# Patient Record
Sex: FEMALE | Race: WHITE | NOT HISPANIC OR LATINO | Employment: OTHER
[De-identification: names, ages, dates, MRNs, and addresses within clinical notes are randomized per-mention and may not be internally consistent; named-entity substitution may affect disease eponyms.]

---

## 2017-07-22 ENCOUNTER — HEALTH MAINTENANCE LETTER (OUTPATIENT)
Age: 27
End: 2017-07-22

## 2018-06-22 ENCOUNTER — TRANSFERRED RECORDS (OUTPATIENT)
Dept: HEALTH INFORMATION MANAGEMENT | Facility: CLINIC | Age: 28
End: 2018-06-22

## 2018-06-22 LAB
ABO + RH BLD: NORMAL
ABO + RH BLD: NORMAL
BLD GP AB SCN SERPL QL: NORMAL
HBV SURFACE AG SERPL QL IA: NORMAL
HIV 1+2 AB+HIV1 P24 AG SERPL QL IA: NON REACTIVE
RUBELLA ANTIBODY IGG QUANTITATIVE: NORMAL IU/ML
TREPONEMA ANTIBODIES: NORMAL

## 2018-09-05 ENCOUNTER — APPOINTMENT (OUTPATIENT)
Dept: GENERAL RADIOLOGY | Facility: CLINIC | Age: 28
End: 2018-09-05
Attending: PHYSICIAN ASSISTANT
Payer: COMMERCIAL

## 2018-09-05 ENCOUNTER — HOSPITAL ENCOUNTER (EMERGENCY)
Facility: CLINIC | Age: 28
Discharge: HOME | End: 2018-09-05
Attending: EMERGENCY MEDICINE | Admitting: EMERGENCY MEDICINE
Payer: COMMERCIAL

## 2018-09-05 VITALS
WEIGHT: 140 LBS | RESPIRATION RATE: 13 BRPM | OXYGEN SATURATION: 100 % | BODY MASS INDEX: 21.76 KG/M2 | SYSTOLIC BLOOD PRESSURE: 108 MMHG | DIASTOLIC BLOOD PRESSURE: 83 MMHG | TEMPERATURE: 98.5 F

## 2018-09-05 DIAGNOSIS — R07.9 ACUTE CHEST PAIN: ICD-10-CM

## 2018-09-05 LAB
ANION GAP SERPL CALCULATED.3IONS-SCNC: 7 MMOL/L (ref 3–14)
BASOPHILS # BLD AUTO: 0 10E9/L (ref 0–0.2)
BASOPHILS NFR BLD AUTO: 0.4 %
BUN SERPL-MCNC: 9 MG/DL (ref 7–30)
CALCIUM SERPL-MCNC: 8.6 MG/DL (ref 8.5–10.1)
CHLORIDE SERPL-SCNC: 106 MMOL/L (ref 94–109)
CO2 SERPL-SCNC: 25 MMOL/L (ref 20–32)
CREAT SERPL-MCNC: 0.71 MG/DL (ref 0.52–1.04)
D DIMER PPP FEU-MCNC: 0.4 UG/ML FEU (ref 0–0.5)
DIFFERENTIAL METHOD BLD: NORMAL
EOSINOPHIL # BLD AUTO: 0.2 10E9/L (ref 0–0.7)
EOSINOPHIL NFR BLD AUTO: 1.6 %
ERYTHROCYTE [DISTWIDTH] IN BLOOD BY AUTOMATED COUNT: 13.1 % (ref 10–15)
GFR SERPL CREATININE-BSD FRML MDRD: >90 ML/MIN/1.7M2
GLUCOSE SERPL-MCNC: 82 MG/DL (ref 70–99)
HCT VFR BLD AUTO: 40.2 % (ref 35–47)
HGB BLD-MCNC: 13.5 G/DL (ref 11.7–15.7)
IMM GRANULOCYTES # BLD: 0.1 10E9/L (ref 0–0.4)
IMM GRANULOCYTES NFR BLD: 0.5 %
INTERPRETATION ECG - MUSE: NORMAL
LYMPHOCYTES # BLD AUTO: 1.8 10E9/L (ref 0.8–5.3)
LYMPHOCYTES NFR BLD AUTO: 16.8 %
MCH RBC QN AUTO: 29.2 PG (ref 26.5–33)
MCHC RBC AUTO-ENTMCNC: 33.6 G/DL (ref 31.5–36.5)
MCV RBC AUTO: 87 FL (ref 78–100)
MONOCYTES # BLD AUTO: 0.7 10E9/L (ref 0–1.3)
MONOCYTES NFR BLD AUTO: 5.9 %
NEUTROPHILS # BLD AUTO: 8.2 10E9/L (ref 1.6–8.3)
NEUTROPHILS NFR BLD AUTO: 74.8 %
NRBC # BLD AUTO: 0 10*3/UL
NRBC BLD AUTO-RTO: 0 /100
PLATELET # BLD AUTO: 203 10E9/L (ref 150–450)
POTASSIUM SERPL-SCNC: 3.4 MMOL/L (ref 3.4–5.3)
RBC # BLD AUTO: 4.63 10E12/L (ref 3.8–5.2)
SODIUM SERPL-SCNC: 138 MMOL/L (ref 133–144)
WBC # BLD AUTO: 11 10E9/L (ref 4–11)

## 2018-09-05 PROCEDURE — 80048 BASIC METABOLIC PNL TOTAL CA: CPT | Performed by: PHYSICIAN ASSISTANT

## 2018-09-05 PROCEDURE — 93005 ELECTROCARDIOGRAM TRACING: CPT

## 2018-09-05 PROCEDURE — 85025 COMPLETE CBC W/AUTO DIFF WBC: CPT | Performed by: PHYSICIAN ASSISTANT

## 2018-09-05 PROCEDURE — 85379 FIBRIN DEGRADATION QUANT: CPT | Performed by: PHYSICIAN ASSISTANT

## 2018-09-05 PROCEDURE — 71046 X-RAY EXAM CHEST 2 VIEWS: CPT

## 2018-09-05 PROCEDURE — 99285 EMERGENCY DEPT VISIT HI MDM: CPT

## 2018-09-05 RX ORDER — PRENATAL VIT/IRON FUM/FOLIC AC 27MG-0.8MG
1 TABLET ORAL DAILY
COMMUNITY

## 2018-09-05 RX ORDER — CETIRIZINE HYDROCHLORIDE 10 MG/1
10 TABLET ORAL DAILY
Status: ON HOLD | COMMUNITY
End: 2019-03-06

## 2018-09-05 ASSESSMENT — ENCOUNTER SYMPTOMS
ARTHRALGIAS: 0
SHORTNESS OF BREATH: 1

## 2018-09-05 NOTE — ED AVS SNAPSHOT
[facility] Emergency Department    [address]    [facility] [address]    Phone:  [phone]    Fax:  [phone]                                       Lisa Redmond   MRN: [id number]    Department:  [facility] Emergency Department   Date of Visit:  9/5/2018           After Visit Summary Signature Page     I have received my discharge instructions, and my questions have been answered. I have discussed any challenges I see with this plan with the nurse or doctor.    ..........................................................................................................................................  Patient/Patient Representative Signature      ..........................................................................................................................................  Patient Representative Print Name and Relationship to Patient    ..................................................               ................................................  Date                                            Time    ..........................................................................................................................................  Reviewed by Signature/Title    ...................................................              ..............................................  Date                                                            Time          22EPIC Rev 08/18

## 2018-09-05 NOTE — ED AVS SNAPSHOT
[facility] Emergency Department    [address]    [facility] [address]    Phone:  [phone]    Fax:  [phone]                                       Lisa Redmond   MRN: [id number]    Department:  [facility] Emergency Department   Date of Visit:  9/5/2018           Patient Information     Date Of Birth          1990        Your diagnoses for this visit were:     Acute chest pain        You were seen by Bradford Dumont MD.      Follow-up Information     Follow up with Raysa Roman MD In 3 days.    Specialty:  Internal Medicine    Contact information:    [address] SUN[address]  [address]  [facility] [address]  [phone]          Follow up with [facility] Emergency Department.    Specialty:  EMERGENCY MEDICINE    Why:  If symptoms worsen    Contact information:    [address]  [facility] [address]  [phone]        Discharge Instructions         *CHEST PAIN, NONCARDIAC    Based on your visit today, the exact cause of your chest pain is not certain. Your condition does not seem serious and your pain does not appear to be coming from your heart. However, sometimes the signs of a serious problem take more time to appear. Therefore, please watch for the warning signs listed below.  HOME CARE:  1. Rest today and avoid strenuous activity.  2. Take any prescribed medicine as directed.  FOLLOW UP with your doctor in 1-3 days.   GET PROMPT MEDICAL ATTENTION if any of the following occur:    A change in the type of pain: if it feels different, becomes more severe, lasts longer, or begins to spread into your shoulder, arm, neck, jaw or back    Shortness of breath or increased pain with breathing    Cough with blood or dark colored sputum (phlegm)    Weakness, dizziness, or fainting    Fever over 101  F (38.3  C)    Swelling, pain or redness in one leg    7652-4083 [employer]. [address] [place] [address]  [place] [address]. All rights reserved. This information is not intended as a substitute for professional medical care. Always follow your healthcare professional's instructions.  This information has been modified by your health care provider with permission from the publisher.      24 Hour Appointment Hotline       To make an appointment at any [facility], call [phone] ([phone]). If you don't have a family doctor or clinic, we will help you find one. [facility] [place] are conveniently located to serve the needs of you and your family.             Review of your medicines      Our records show that you are taking the medicines listed below. If these are incorrect, please call your family doctor or clinic.        Dose / Directions Last dose taken    BENADRYL PO        Refills:  0        cetirizine 10 MG tablet   Commonly known as:  zyrTEC   Dose:  10 mg        Take 10 mg by mouth daily   Refills:  0        NO ACTIVE MEDICATIONS        Refills:  0        prenatal multivitamin plus iron 27-0.8 MG Tabs per tablet   Dose:  1 tablet        Take 1 tablet by mouth daily   Refills:  0                Procedures and tests performed during your visit     Basic metabolic panel    CBC with platelets differential    D dimer quantitative    EKG 12 lead    Peripheral IV catheter    XR Chest 2 Views      Orders Needing Specimen Collection     None      Pending Results     No orders found from 9/3/2018 to 9/6/2018.            Pending Culture Results     No orders found from 9/3/2018 to 9/6/2018.            Pending Results Instructions     If you had any lab results that were not finalized at the time of your Discharge, you can call the ED Lab Result RN at [phone]. You will be contacted by this team for any positive Lab results or changes in treatment. The nurses are available 7 days a week from 10A to 6:30P.  You can leave a message 24 hours per day and they will return your call.        Test Results From Your Hospital  Stay        9/5/2018 10:44 AM      Component Results     Component Value Ref Range & Units Status    WBC 11.0 4.0 - 11.0 10e9/L Final    RBC Count 4.63 3.8 - 5.2 10e12/L Final    Hemoglobin 13.5 11.7 - 15.7 g/dL Final    Hematocrit 40.2 35.0 - 47.0 % Final    MCV 87 78 - 100 fl Final    MCH 29.2 26.5 - 33.0 pg Final    MCHC 33.6 31.5 - 36.5 g/dL Final    RDW 13.1 10.0 - 15.0 % Final    Platelet Count 203 150 - 450 10e9/L Final    Diff Method Automated Method  Final    % Neutrophils 74.8 % Final    % Lymphocytes 16.8 % Final    % Monocytes 5.9 % Final    % Eosinophils 1.6 % Final    % Basophils 0.4 % Final    % Immature Granulocytes 0.5 % Final    Nucleated RBCs 0 0 /100 Final    Absolute Neutrophil 8.2 1.6 - 8.3 10e9/L Final    Absolute Lymphocytes 1.8 0.8 - 5.3 10e9/L Final    Absolute Monocytes 0.7 0.0 - 1.3 10e9/L Final    Absolute Eosinophils 0.2 0.0 - 0.7 10e9/L Final    Absolute Basophils 0.0 0.0 - 0.2 10e9/L Final    Abs Immature Granulocytes 0.1 0 - 0.4 10e9/L Final    Absolute Nucleated RBC 0.0  Final         9/5/2018 10:53 AM      Component Results     Component Value Ref Range & Units Status    D Dimer 0.4 0.0 - 0.50 ug/ml FEU Final    This D-dimer assay is intended for use in conjunction with a clinical pretest   probability assessment model to exclude pulmonary embolism (PE) and deep   venous thrombosis (DVT) in outpatients suspected of PE or DVT. The cut-off   value is 0.5 ug/mL FEU.           9/5/2018 10:58 AM      Component Results     Component Value Ref Range & Units Status    Sodium 138 133 - 144 mmol/L Final    Potassium 3.4 3.4 - 5.3 mmol/L Final    Chloride 106 94 - 109 mmol/L Final    Carbon Dioxide 25 20 - 32 mmol/L Final    Anion Gap 7 3 - 14 mmol/L Final    Glucose 82 70 - 99 mg/dL Final    Urea Nitrogen 9 7 - 30 mg/dL Final    Creatinine 0.71 0.52 - 1.04 mg/dL Final    GFR Estimate >90 >60 mL/min/1.7m2 Final    Non  GFR Calc    GFR Estimate If Black >90 >60 mL/min/1.7m2  Final     GFR Calc    Calcium 8.6 8.5 - 10.1 mg/dL Final         9/5/2018 11:16 AM      Narrative     CHEST TWO VIEWS   9/5/2018 10:58 AM     HISTORY: Sudden onset left-sided chest pain.     COMPARISON: None.        Impression     IMPRESSION: Lungs are well-inflated and clear. No evidence of  pneumothorax or effusion. Heart size is normal.    TOM KEVIN MD                Clinical Quality Measure: Blood Pressure Screening     Your blood pressure was checked while you were in the emergency department today. The last reading we obtained was  BP: 108/83 . Please read the guidelines below about what these numbers mean and what you should do about them.  If your systolic blood pressure (the top number) is less than 120 and your diastolic blood pressure (the bottom number) is less than 80, then your blood pressure is normal. There is nothing more that you need to do about it.  If your systolic blood pressure (the top number) is 120-139 or your diastolic blood pressure (the bottom number) is 80-89, your blood pressure may be higher than it should be. You should have your blood pressure rechecked within a year by a primary care provider.  If your systolic blood pressure (the top number) is 140 or greater or your diastolic blood pressure (the bottom number) is 90 or greater, you may have high blood pressure. High blood pressure is treatable, but if left untreated over time it can put you at risk for heart attack, stroke, or kidney failure. You should have your blood pressure rechecked by a primary care provider within the next 4 weeks.  If your provider in the emergency department today gave you specific instructions to follow-up with your doctor or provider even sooner than that, you should follow that instruction and not wait for up to 4 weeks for your follow-up visit.        Thank you for choosing [facility]       Thank you for choosing [facility] for your care. Our goal is always to provide you with  "excellent care. Hearing back from our patients is one way we can continue to improve our services. Please take a few minutes to complete the written survey that you may receive in the mail after you visit with us. Thank you!        [employer] Information     [employer] lets you send messages to your doctor, view your test results, renew your prescriptions, schedule appointments and more. To sign up, go to [email or web address] . Click on \"Log in\" on the left side of the screen, which will take you to the Welcome page. Then click on \"Sign up Now\" on the right side of the page.     You will be asked to enter the access code listed below, as well as some personal information. Please follow the directions to create your username and password.     Your access code is: [email or web address]  Expires: 2018 11:25 AM     Your access code will  in 90 days. If you need help or a new code, please call your [facility] clinic or [phone].        Care EveryWhere ID     This is your Care EveryWhere ID. This could be used by other organizations to access your [facility] medical records  [id number]        Equal Access to Services     THERESA HOLT : Hadcam Cortez, erika bassett, jessica aguirre, sophia bangura . So [facility] [phone].    ATENCIÓN: Si habla español, tiene a rae disposición servicios gratuitos de asistencia lingüística. Llame al [phone].    We comply with applicable federal civil rights laws and [place] laws. We do not discriminate on the basis of race, color, national origin, age, disability, sex, sexual orientation, or gender identity.            After Visit Summary       This is your record. Keep this with you and show to your community pharmacist(s) and doctor(s) at your next visit.                  "

## 2018-09-05 NOTE — ED NOTES
Emergency Department Attending Supervision Note  9/5/2018  11:51 AM      I evaluated this patient in conjunction with Fransisco OLSON      Briefly, this 26 y/o patient presented after referral [facility]et her clinic after an episode of chest pain yesterday and is now feeling some symptoms of shortness of breath today. The patient is approzximatyely 15 weeks pregnany. She is denying any complications with the pregnancy, specifically any bleeding or other abdominal pain. She has no chest discomfort, either pleuritic or with movement. She said she had some symptomatology feeling shortness of breath this morning but does not feel any shortness of breath at rest at this time. Her lab tests, EKG and imaging are unremarkable.       On my exam,  HENT: Patient TMs are unremarkable.   Lungs are clear. There are no rales or evidence of increased work of breathing.   CV: Heart sounds regular.   Abdomen: Nontender.   MSK: No lower extremity edema or calf tenderness.     Results:    ED course:    My impression: Testing in ED is very reassuring.  There is no indication for further testing. It is very unlikely that she has a PE given her symptoms.  There are no concerning vitals signs or physical findings We reviewed  the results with patient and she is in agreement with her plan of care.         Diagnosis    ICD-10-CM   1. Acute chest pain R07.9         MD Tim Mckeon Lucas P, MD  09/05/18 0523

## 2018-09-05 NOTE — DISCHARGE INSTRUCTIONS
*CHEST PAIN, NONCARDIAC    Based on your visit today, the exact cause of your chest pain is not certain. Your condition does not seem serious and your pain does not appear to be coming from your heart. However, sometimes the signs of a serious problem take more time to appear. Therefore, please watch for the warning signs listed below.  HOME CARE:  1. Rest today and avoid strenuous activity.  2. Take any prescribed medicine as directed.  FOLLOW UP with your doctor in 1-3 days.   GET PROMPT MEDICAL ATTENTION if any of the following occur:    A change in the type of pain: if it feels different, becomes more severe, lasts longer, or begins to spread into your shoulder, arm, neck, jaw or back    Shortness of breath or increased pain with breathing    Cough with blood or dark colored sputum (phlegm)    Weakness, dizziness, or fainting    Fever over 101  F (38.3  C)    Swelling, pain or redness in one leg    8918-4194 [employer]. [address] [place] [address]. All rights reserved. This information is not intended as a substitute for professional medical care. Always follow your healthcare professional's instructions.  This information has been modified by your health care provider with permission from the publisher.

## 2018-09-05 NOTE — ED TRIAGE NOTES
Chest pain started yesterday. States she was not doing anything at that time. It was a sharp pain, lasting approx 30 mins. No longer having the pain, but is now feeling more SOB, worse as she moves around. 15 weeks pregnant. Denies having symptoms like this in the past.

## 2018-09-05 NOTE — ED PROVIDER NOTES
History     Chief Complaint:  Chest Pain      HPI   Lisa Redmond is a  27 year old  female , 15 weeks gestation,who presents with her  for evaluation of a 30 minute episode of chest pain and shortness of breath last evening. The patient was called her OB/GYN nursing line where she was informed that if she develops another episode of chest pain that she should come into the Emergency Department for emergent work up. She reports that her cough developed as she was just walking around and was worsened with coughing. The patient reports that she has not had another episode of chest pain since yesterday. This morning the patient received a call from her OB/GYN nursing line where she reported that the chest pain had resolved but she still had symptoms of shortness of breath so she was advised to come to the Emergency Department for a work up. She has no recent travel history or other prolonged immobilization. She denies any chest pain here today. She denies past history of smoking. She is currently taking prenatal vitamins. She denies swelling or pain in the legs.     Allergies:  No Known Allergies     Medications:    Zyrtec PO PRN  Benadryl PO PRN  Prenatal vitamins     Past Medical History:    Allergic rhinitis due to other allergen  Generalized hyperhidrosis  Acne  Atopic dermatitis and related conditions    Past Surgical History:    No past surgical history on file.    Family History:    Lipids  coronary artery disease  Asthma    Social History:  The patient  reports that she has never smoked. She does not have any smokeless tobacco history on file. She reports that she does not drink alcohol or use illicit drugs.   Marital Status:   [2]     Review of Systems   Respiratory: Positive for shortness of breath.    Cardiovascular: Positive for chest pain (none today). Negative for leg swelling.   Musculoskeletal: Negative for arthralgias.   All other systems reviewed and are  negative.    Physical Exam   First Vitals:  BP: 124/72  Pulse: (P) 79  Heart Rate: (P) 79  Temp: 98.5  F (36.9  C)  Resp: (P) 18  Weight: 63.5 kg (140 lb)  SpO2: (P) 100 %      Physical Exam    General: Well-nourished, appears stated age  Eyes: PERRL, conjunctivae pink no scleral icterus or conjunctival injection  ENT:  Moist mucus membranes, pharynx nonerythematous and without erythema  Respiratory:  No respiratory distress, no wheezes, crackles or rales  CV: Normal rate, no murmurs, rubs or gallops  GI: fundus nonpalpable, Nontender, nondistended, normal bowel sounds, no rebound or guarding  : No CVA tenderness  Skin: Warm, dry.  No rashes or petechiae  Musculoskeletal: No peripheral edema or calf tenderness, strength 5 out of 5 throughout  Neuro: GCS 15, alert and oriented to person/place/time, cranial nerves II through XII intact  Psychiatric: Normal affect      Emergency Department Course   ECG (10:10:45):  Rate 91 bpm. ME interval 146. QRS duration 86. QT/QTc 354/435. P-R-T axes 77 101 66. Normal sinus rhythm with sinus arrhythmia. Rightward axis. Borderline EKG.  Interpreted at 1015 by Tim Felder**.    Imaging:  XR Chest 2 Views   Final Result   IMPRESSION: Lungs are well-inflated and clear. No evidence of   pneumothorax or effusion. Heart size is normal.      TOM KEVIN MD           Laboratory:  CBC: WNL  D Dimer 0.4  BMP: WNL     Emergency Department Course:  Past medical records, nursing notes, and vitals reviewed.  1019: I performed an exam of the patient and obtained history, as documented above.       Patient was evaluated by Dr. Dumont.     Rechecked the patient, findings and plan explained to the patient. Patient discharged home, status improved, with instructions regarding supportive care, medications, and reasons to return as well as the importance of close follow-up was reviewed.    Impression & Plan    Medical Decision Making:    Lisa Redmond is a 27-year-old   female who presents with chest pain.  The work up in the Emergency Department is negative.  The differential diagnosis of chest pain is broad and includes life threatening etiologies such as Acute coronary syndrome, Myocardial infarction, Pulmonary Embolism, and Acute Aortic Dissection.  Other causes may include pneumonia, pneumothorax, pericarditis, pleurisy, and esophageal spasm.  Evaluation in the emergency department today included a EKG that was normal sinus rhythm without ST segment changes.  Unremarkable CBC, BMP and a negative d-dimer study.  Chest x-ray shows no evidence of pneumothorax or effusion.  Primary concern for this patient's chest pain was likelihood of a pulmonary embolism given her pregnancy.  Although unlikely at this stage of pregnancy and the patient had no tachycardia, tachypnea, or hypoxia or pleuritic chest pain that would support the diagnosis of a PE her negative d-dimer study is reassuring.  No serious etiology for the chest pain was detected today during this visit.  Patient has been chest pain-free since yesterday currently no indication for further evaluation.  Close follow up with primary care and OB is indicated should the pain continue, as further work up may be performed; this was made clear to Lisa, who understands.    Critical Care time:  none    Diagnosis:    ICD-10-CM    1. Acute chest pain R07.9 Basic metabolic panel       Disposition:  discharged to home    Discharge Medications:  Discharge Medication List as of 9/5/2018 11:35 AM          Fransisco Valencia PA-C  [facility] EMERGENCY DEPARTMENT       Fransisco Valencia PA-C  09/05/18 123    See Supervisory note         Bradford Dumont MD  09/05/18 5303

## 2019-02-05 LAB — GROUP B STREP PCR: NORMAL

## 2019-03-05 ENCOUNTER — HOSPITAL ENCOUNTER (INPATIENT)
Facility: CLINIC | Age: 29
LOS: 1 days | Discharge: HOME | End: 2019-03-06
Attending: OBSTETRICS & GYNECOLOGY | Admitting: OBSTETRICS & GYNECOLOGY
Payer: COMMERCIAL

## 2019-03-05 LAB
ABO + RH BLD: NORMAL
ABO + RH BLD: NORMAL
BLD GP AB SCN SERPL QL: NORMAL
BLOOD BANK CMNT PATIENT-IMP: NORMAL
SPECIMEN EXP DATE BLD: NORMAL

## 2019-03-05 PROCEDURE — 25800030 ZZH RX IP 258 OP 636: Performed by: OBSTETRICS & GYNECOLOGY

## 2019-03-05 PROCEDURE — 36415 COLL VENOUS BLD VENIPUNCTURE: CPT | Performed by: OBSTETRICS & GYNECOLOGY

## 2019-03-05 PROCEDURE — 12000035 ZZH R&B POSTPARTUM

## 2019-03-05 PROCEDURE — 72200001 ZZH LABOR CARE VAGINAL DELIVERY SINGLE

## 2019-03-05 PROCEDURE — 0064U ANTB TP TOTAL&RPR IA QUAL: CPT | Performed by: OBSTETRICS & GYNECOLOGY

## 2019-03-05 PROCEDURE — 10907ZC DRAINAGE OF AMNIOTIC FLUID, THERAPEUTIC FROM PRODUCTS OF CONCEPTION, VIA NATURAL OR ARTIFICIAL OPENING: ICD-10-PCS | Performed by: OBSTETRICS & GYNECOLOGY

## 2019-03-05 PROCEDURE — 25000125 ZZHC RX 250: Performed by: OBSTETRICS & GYNECOLOGY

## 2019-03-05 PROCEDURE — 25000132 ZZH RX MED GY IP 250 OP 250 PS 637: Performed by: OBSTETRICS & GYNECOLOGY

## 2019-03-05 PROCEDURE — 0KQM0ZZ REPAIR PERINEUM MUSCLE, OPEN APPROACH: ICD-10-PCS | Performed by: OBSTETRICS & GYNECOLOGY

## 2019-03-05 PROCEDURE — 86900 BLOOD TYPING SEROLOGIC ABO: CPT | Performed by: OBSTETRICS & GYNECOLOGY

## 2019-03-05 PROCEDURE — 86901 BLOOD TYPING SEROLOGIC RH(D): CPT | Performed by: OBSTETRICS & GYNECOLOGY

## 2019-03-05 PROCEDURE — 3E033VJ INTRODUCTION OF OTHER HORMONE INTO PERIPHERAL VEIN, PERCUTANEOUS APPROACH: ICD-10-PCS | Performed by: OBSTETRICS & GYNECOLOGY

## 2019-03-05 PROCEDURE — 86850 RBC ANTIBODY SCREEN: CPT | Performed by: OBSTETRICS & GYNECOLOGY

## 2019-03-05 RX ORDER — IBUPROFEN 400 MG/1
800 TABLET, FILM COATED ORAL EVERY 6 HOURS PRN
Status: DISCONTINUED | OUTPATIENT
Start: 2019-03-05 | End: 2019-03-06 | Stop reason: HOSPADM

## 2019-03-05 RX ORDER — HYDROCORTISONE 2.5 %
CREAM (GRAM) TOPICAL 3 TIMES DAILY PRN
Status: DISCONTINUED | OUTPATIENT
Start: 2019-03-05 | End: 2019-03-06 | Stop reason: HOSPADM

## 2019-03-05 RX ORDER — SODIUM CHLORIDE, SODIUM LACTATE, POTASSIUM CHLORIDE, CALCIUM CHLORIDE 600; 310; 30; 20 MG/100ML; MG/100ML; MG/100ML; MG/100ML
INJECTION, SOLUTION INTRAVENOUS CONTINUOUS
Status: DISCONTINUED | OUTPATIENT
Start: 2019-03-05 | End: 2019-03-05

## 2019-03-05 RX ORDER — CARBOPROST TROMETHAMINE 250 UG/ML
250 INJECTION, SOLUTION INTRAMUSCULAR
Status: DISCONTINUED | OUTPATIENT
Start: 2019-03-05 | End: 2019-03-05

## 2019-03-05 RX ORDER — LANOLIN 100 %
OINTMENT (GRAM) TOPICAL
Status: DISCONTINUED | OUTPATIENT
Start: 2019-03-05 | End: 2019-03-06 | Stop reason: HOSPADM

## 2019-03-05 RX ORDER — OXYCODONE AND ACETAMINOPHEN 5; 325 MG/1; MG/1
1 TABLET ORAL
Status: DISCONTINUED | OUTPATIENT
Start: 2019-03-05 | End: 2019-03-05

## 2019-03-05 RX ORDER — METHYLERGONOVINE MALEATE 0.2 MG/ML
200 INJECTION INTRAVENOUS
Status: DISCONTINUED | OUTPATIENT
Start: 2019-03-05 | End: 2019-03-05

## 2019-03-05 RX ORDER — NALOXONE HYDROCHLORIDE 0.4 MG/ML
.1-.4 INJECTION, SOLUTION INTRAMUSCULAR; INTRAVENOUS; SUBCUTANEOUS
Status: DISCONTINUED | OUTPATIENT
Start: 2019-03-05 | End: 2019-03-06 | Stop reason: HOSPADM

## 2019-03-05 RX ORDER — FENTANYL CITRATE 50 UG/ML
50-100 INJECTION, SOLUTION INTRAMUSCULAR; INTRAVENOUS
Status: DISCONTINUED | OUTPATIENT
Start: 2019-03-05 | End: 2019-03-05

## 2019-03-05 RX ORDER — OXYTOCIN/0.9 % SODIUM CHLORIDE 30/500 ML
340 PLASTIC BAG, INJECTION (ML) INTRAVENOUS CONTINUOUS PRN
Status: DISCONTINUED | OUTPATIENT
Start: 2019-03-05 | End: 2019-03-06 | Stop reason: HOSPADM

## 2019-03-05 RX ORDER — OXYTOCIN/0.9 % SODIUM CHLORIDE 30/500 ML
100 PLASTIC BAG, INJECTION (ML) INTRAVENOUS CONTINUOUS
Status: DISCONTINUED | OUTPATIENT
Start: 2019-03-05 | End: 2019-03-06 | Stop reason: HOSPADM

## 2019-03-05 RX ORDER — ONDANSETRON 2 MG/ML
4 INJECTION INTRAMUSCULAR; INTRAVENOUS EVERY 6 HOURS PRN
Status: DISCONTINUED | OUTPATIENT
Start: 2019-03-05 | End: 2019-03-05

## 2019-03-05 RX ORDER — AMOXICILLIN 250 MG
2 CAPSULE ORAL 2 TIMES DAILY
Status: DISCONTINUED | OUTPATIENT
Start: 2019-03-05 | End: 2019-03-06 | Stop reason: HOSPADM

## 2019-03-05 RX ORDER — METHYLERGONOVINE MALEATE 0.2 MG/ML
200 INJECTION INTRAVENOUS
Status: DISCONTINUED | OUTPATIENT
Start: 2019-03-05 | End: 2019-03-06 | Stop reason: HOSPADM

## 2019-03-05 RX ORDER — OXYTOCIN 10 [USP'U]/ML
10 INJECTION, SOLUTION INTRAMUSCULAR; INTRAVENOUS
Status: DISCONTINUED | OUTPATIENT
Start: 2019-03-05 | End: 2019-03-06 | Stop reason: HOSPADM

## 2019-03-05 RX ORDER — OXYTOCIN/0.9 % SODIUM CHLORIDE 30/500 ML
100-340 PLASTIC BAG, INJECTION (ML) INTRAVENOUS CONTINUOUS PRN
Status: COMPLETED | OUTPATIENT
Start: 2019-03-05 | End: 2019-03-05

## 2019-03-05 RX ORDER — AMOXICILLIN 250 MG
1 CAPSULE ORAL 2 TIMES DAILY
Status: DISCONTINUED | OUTPATIENT
Start: 2019-03-05 | End: 2019-03-06 | Stop reason: HOSPADM

## 2019-03-05 RX ORDER — ACETAMINOPHEN 325 MG/1
650 TABLET ORAL EVERY 4 HOURS PRN
Status: DISCONTINUED | OUTPATIENT
Start: 2019-03-05 | End: 2019-03-06 | Stop reason: HOSPADM

## 2019-03-05 RX ORDER — NALOXONE HYDROCHLORIDE 0.4 MG/ML
.1-.4 INJECTION, SOLUTION INTRAMUSCULAR; INTRAVENOUS; SUBCUTANEOUS
Status: DISCONTINUED | OUTPATIENT
Start: 2019-03-05 | End: 2019-03-05

## 2019-03-05 RX ORDER — BISACODYL 10 MG
10 SUPPOSITORY, RECTAL RECTAL DAILY PRN
Status: DISCONTINUED | OUTPATIENT
Start: 2019-03-07 | End: 2019-03-06 | Stop reason: HOSPADM

## 2019-03-05 RX ORDER — LIDOCAINE 40 MG/G
CREAM TOPICAL
Status: DISCONTINUED | OUTPATIENT
Start: 2019-03-05 | End: 2019-03-05

## 2019-03-05 RX ORDER — OXYTOCIN/0.9 % SODIUM CHLORIDE 30/500 ML
1-24 PLASTIC BAG, INJECTION (ML) INTRAVENOUS CONTINUOUS
Status: DISCONTINUED | OUTPATIENT
Start: 2019-03-05 | End: 2019-03-05

## 2019-03-05 RX ORDER — OXYTOCIN 10 [USP'U]/ML
10 INJECTION, SOLUTION INTRAMUSCULAR; INTRAVENOUS
Status: DISCONTINUED | OUTPATIENT
Start: 2019-03-05 | End: 2019-03-05

## 2019-03-05 RX ORDER — IBUPROFEN 400 MG/1
800 TABLET, FILM COATED ORAL
Status: DISCONTINUED | OUTPATIENT
Start: 2019-03-05 | End: 2019-03-05

## 2019-03-05 RX ORDER — ACETAMINOPHEN 325 MG/1
650 TABLET ORAL EVERY 4 HOURS PRN
Status: DISCONTINUED | OUTPATIENT
Start: 2019-03-05 | End: 2019-03-05

## 2019-03-05 RX ADMIN — OXYTOCIN-SODIUM CHLORIDE 0.9% IV SOLN 30 UNIT/500ML 340 ML/HR: 30-0.9/5 SOLUTION at 14:11

## 2019-03-05 RX ADMIN — ACETAMINOPHEN 650 MG: 325 TABLET, FILM COATED ORAL at 16:12

## 2019-03-05 RX ADMIN — SENNOSIDES AND DOCUSATE SODIUM 1 TABLET: 8.6; 5 TABLET ORAL at 19:29

## 2019-03-05 RX ADMIN — OXYTOCIN-SODIUM CHLORIDE 0.9% IV SOLN 30 UNIT/500ML 2 MILLI-UNITS/MIN: 30-0.9/5 SOLUTION at 10:27

## 2019-03-05 RX ADMIN — IBUPROFEN 800 MG: 400 TABLET ORAL at 22:21

## 2019-03-05 RX ADMIN — SODIUM CHLORIDE, POTASSIUM CHLORIDE, SODIUM LACTATE AND CALCIUM CHLORIDE: 600; 310; 30; 20 INJECTION, SOLUTION INTRAVENOUS at 10:06

## 2019-03-05 RX ADMIN — IBUPROFEN 800 MG: 400 TABLET ORAL at 16:12

## 2019-03-05 RX ADMIN — ACETAMINOPHEN 650 MG: 325 TABLET, FILM COATED ORAL at 22:21

## 2019-03-05 NOTE — PLAN OF CARE
Admitted today for induction, AROM. Patient ambulating in AM, pitocin started. Patient continued to ambulate in room throughout labor. Declines pain medication/intervention. Spouse at bedside, supportive.  @ 1409 of baby girl. Breastfeeding. Voided since delivery. Tylenol and Ibuprofen given after delivery.

## 2019-03-05 NOTE — PLAN OF CARE
Data: Lisa Redmond transferred to [facility] via wheelchair at 1620. Baby transferred via parent's arms.  Action: Receiving unit notified of transfer: Yes. Patient and family notified of room change. Report given to Gracia at 1620. Belongings sent to receiving unit. Accompanied by Registered Nurse. Oriented patient to surroundings. Call light within reach. ID bands double-checked with receiving RN.  Response: Patient tolerated transfer and is stable.

## 2019-03-05 NOTE — H&P
See prenatal record    29yo  at 40 6/7wks presents for IOL  Cvx 3/70/-1  GBS negative  Hx unmedicated delivery in [facility] 2[place] c/b tough delivery, pushed 1.5 hours.  Baby had some neck trauma, fine now, declines C/S for this one.  EFW 8-8.5#  FHT category I  AROM clear.  Pt wants to hold on pitocin here at first, will start at 10AM if does not have regular UC's  Pt plans on having unmedicated labor and delivery this time as well. /[place]

## 2019-03-05 NOTE — PLAN OF CARE
Data: Patient admitted to room 213 at 0715. Patient is a . Prenatal record reviewed.   Obstetric History       T1      L1     SAB0   TAB0   Ectopic0   Multiple0   Live Births1       # Outcome Date GA Lbr Sabino/2nd Weight Sex Delivery Anes PTL Lv   2 Current            1 Term 16   3.771 kg (8 lb 5 oz) F Vag-Spont None N CHULA      .  Medical History:   Past Medical History:   Diagnosis Date     Allergic rhinitis due to other allergen     has been tested, cats, dogs, seasonal     Generalized hyperhidrosis      Other acne      Other atopic dermatitis and related conditions    .  Gestational age 40w6d. Vital signs per doc flowsheet. Fetal movement present. Patient reports Induction Of Labor   as reason for admission. Support persons Humphrey present.  Action:  RN obtained at 0715. Care of patient assumed at 0715. Verbal consent for EFM, external fetal monitors applied. Admission assessment completed. Patient and support persons educated on labor process. Patient instructed to report change in fetal movement, contractions, vaginal leaking of fluid or bleeding, abdominal pain, or any concerns related to the pregnancy to her nurse/physician. Patient oriented to room, call light in reach.   Response: Dr. Downing informed of admission. Plan per provider is AROM, pit by 1000 if contractions not regular. Patient verbalized understanding of education and verbalized agreement with plan. Patient coping with labor.

## 2019-03-05 NOTE — PLAN OF CARE
Assisted Pt up to bathroom.  Pt denied feeling any dizziness when up.  Pt voided 200 ml.  Tracy cares performed.  Pt walked back to bed and tolerated well.  Families are here visiting .  Continues to monitor Pt.

## 2019-03-05 NOTE — L&D DELIVERY NOTE
27yo  at 40 6/7wks presents for IOL.  Cvx 3/70/-1  GBS neg  AROM clear  Pitocin induction  Unmedicated labor  Pushed twice in second stage  Vaginal delivery female infant 7#15oz with apgars 8 and 9 at 1407  Shoulders and body spont  Delayed cord clamping  Placenta spont and intact  QBL 150cc  Second degree midline perineal lac, repaired with local anesthetic  Mother and infant doing well/LBakerMD

## 2019-03-05 NOTE — PLAN OF CARE
Pt. admitted from L&D  via wheelchair and transferred to bed with stand by assisted. Pt. arrived with baby and was accompanied by , nurse, and arrived with personal belongings. Report was taken from Bertha Wagner RN in L&D. VSS. Fundus is firm and midline.  Vaginal bleeding is present.  SL infusing.  Pt. oriented to the room and call light system.

## 2019-03-06 ENCOUNTER — DOCUMENTATION ONLY (OUTPATIENT)
Dept: CARE COORDINATION | Facility: CLINIC | Age: 29
End: 2019-03-06

## 2019-03-06 VITALS
RESPIRATION RATE: 16 BRPM | SYSTOLIC BLOOD PRESSURE: 121 MMHG | TEMPERATURE: 97.4 F | DIASTOLIC BLOOD PRESSURE: 75 MMHG | HEART RATE: 83 BPM

## 2019-03-06 LAB — T PALLIDUM AB SER QL: NONREACTIVE

## 2019-03-06 PROCEDURE — 25000132 ZZH RX MED GY IP 250 OP 250 PS 637: Performed by: OBSTETRICS & GYNECOLOGY

## 2019-03-06 RX ADMIN — SENNOSIDES AND DOCUSATE SODIUM 1 TABLET: 8.6; 5 TABLET ORAL at 08:32

## 2019-03-06 RX ADMIN — IBUPROFEN 800 MG: 400 TABLET ORAL at 05:38

## 2019-03-06 RX ADMIN — IBUPROFEN 800 MG: 400 TABLET ORAL at 11:26

## 2019-03-06 RX ADMIN — ACETAMINOPHEN 650 MG: 325 TABLET, FILM COATED ORAL at 11:26

## 2019-03-06 RX ADMIN — ACETAMINOPHEN 650 MG: 325 TABLET, FILM COATED ORAL at 05:38

## 2019-03-06 NOTE — PLAN OF CARE
VSS.  Pain well controlled, requesting prn pain medications as needed.  Up independently in room.  Working on breastfeeding  and  cares. Pt is planning to discharge to home around 5 pm today. Continue to monitor and notify MD as needed.

## 2019-03-06 NOTE — L&D DELIVERY NOTE
Delivery Date:  2019      DELIVERY PHYSICIAN AND PRIMARY CARE PHYSICIAN:  Latasha Downing MD      Lisa is a 28-year-old  2, para 1 at 40 and 6/7 weeks gestation who presented for induction of labor.  Her cervix was favorable at 3 cm dilated, 70% effaced, and -1 station.  Group B strep was negative.  She had a history of unmedicated Labor and Delivery in [facility] complicated by a tough vaginal delivery where she pushed for 1.5 hours.  The baby did have some neck trauma, however, is fine now.  We discussed the Mohs of delivery in length and she declines a .  Estimated fetal weight is 8-8-1/2 pounds.  Fetal heart tones were category 1.  Her membranes were ruptured, and Pitocin induction was initiated.  She became complete and pushed twice in the second stage of labor.  She underwent a vaginal delivery of a female infant, 7 pounds 15 ounces, with Apgars of 8 and 9 at 1407 hours.  Shoulders and body were spontaneously delivered.  Delayed cord clamping was performed.  Placenta was spontaneously delivered and intact.  There were no cervix or sulcus lacerations noted.  The patient did have a second-degree midline perineal laceration which was repaired with 2-0 chromic suture in the standard fashion and with local anesthetic.  There were no sponges left in the vagina and the rectum was intact following the delivery and the repair.  QBL was 150 mL.  Both mother and infant were doing well at the conclusion of the delivery and the repair.      FINAL DELIVERY DIAGNOSES:   1.  Intrauterine pregnancy at 40 and 6/7 weeks gestation, admitted for induction of labor.   2.  Favorable cervix.   3.  Unmedicated labor and delivery.   4.  Pitocin induction.   5.  Short second stage of labor.   6.  Vaginal delivery of a female infant, 7 pounds 15 ounces, with Apgars of 8 and 9.   7.  Delayed cord clamping.   8.  Second-degree midline perineal laceration, repaired.         LATASHA DOWNING MD             D: 2019    T: 2019   MT: JESSICA      Name:     ENRIQUE TINEO   MRN:      [id number]        Account:        [id number]   :      1990        Delivery Date:  2019               Document: [id number]

## 2019-03-06 NOTE — PROGRESS NOTES
[facility] Obstetrics Postpartum Progress Note  3/6/2019     S: Pt doing well. Pain is well controlled. Bleeding Moderate. Infant is being . Voiding spontaneously. No BM yet    O:  /67   Pulse 83   Temp 97.8  F (36.6  C) (Oral)   Resp 18   LMP 2018   Breastfeeding? Unknown    Gen: healthy, alert and no distress    Resp: nonlabored breathing  Abd: soft, nondistended, appropriately TTP, FF at u  Ext: non-tender, no edema    Hemoglobin   Date Value Ref Range Status   2018 13.5 11.7 - 15.7 g/dL Final   2010 13.5 11.7 - 15.7 g/dL Final     Lab Results   Component Value Date    ABO O 2019    RH Pos 2019    AS Neg 2019       A: 28 year old  PPD#1 s/p    P:   Routine postpartum cares.    Ambulation encouraged  Pain control measures as needed  Reportable signs and symptoms dicussed with the patient  Anticipate discharge tomorrow      Peytno Sousa, DO  Obstetrics, Gynecology, and Infertility

## 2019-03-06 NOTE — PLAN OF CARE
Vital signs stable. Fundus firm, U/1, bleeding light, no clots. Using ice/tucks on perineum. Up in room independently, encouraged frequent voiding. Taking Tylenol/ibuprofen for pain as needed. Working on breastfeeding  with nipple shield, independent with  cares. Patient reports using a nipple shield with first baby and requested to see lactation today for tips on latching.  supportive and at the bedside. Will continue to monitor and notify MD as needed.

## 2019-03-06 NOTE — PROGRESS NOTES
[facility] will be sharing updates with you on Maternal Child Health Referral requests for home care services.  This is for care coordination purposes and alert you to referral status.  We received the referral for  Lisa Redmond; MRN [id number] and want to update you:      Home visit for postpartum/  assessment and education offered to patient.  Patient declined visit for herself and baby.  Advised to follow up with Primary Care Providers for mom and baby.   Ordering MD and Primary Care Providers for mom and baby notified.     Sincerely [facility]  Aliyah Wilde  [phone]

## 2020-08-06 LAB
HBV SURFACE AG SERPL QL IA: NORMAL
HIV 1+2 AB+HIV1 P24 AG SERPL QL IA: NORMAL
RUBELLA ANTIBODY IGG QUANTITATIVE: 41 IU/ML
TREPONEMA ANTIBODIES: NORMAL

## 2021-02-15 LAB — GROUP B STREP PCR: NORMAL

## 2021-03-11 ENCOUNTER — HOSPITAL ENCOUNTER (INPATIENT)
Facility: CLINIC | Age: 31
LOS: 1 days | Discharge: HOME | End: 2021-03-12
Attending: OBSTETRICS & GYNECOLOGY | Admitting: OBSTETRICS & GYNECOLOGY
Payer: COMMERCIAL

## 2021-03-11 LAB
ABO + RH BLD: NORMAL
ABO + RH BLD: NORMAL
ALT SERPL W P-5'-P-CCNC: 20 U/L (ref 0–50)
AST SERPL W P-5'-P-CCNC: 20 U/L (ref 0–45)
BASOPHILS # BLD AUTO: 0 10E9/L (ref 0–0.2)
BASOPHILS NFR BLD AUTO: 0.3 %
BLD GP AB SCN SERPL QL: NORMAL
BLOOD BANK CMNT PATIENT-IMP: NORMAL
CREAT SERPL-MCNC: 0.73 MG/DL (ref 0.52–1.04)
DIFFERENTIAL METHOD BLD: ABNORMAL
EOSINOPHIL # BLD AUTO: 0.1 10E9/L (ref 0–0.7)
EOSINOPHIL NFR BLD AUTO: 0.5 %
ERYTHROCYTE [DISTWIDTH] IN BLOOD BY AUTOMATED COUNT: 13 % (ref 10–15)
GFR SERPL CREATININE-BSD FRML MDRD: >90 ML/MIN/{1.73_M2}
HCT VFR BLD AUTO: 40.7 % (ref 35–47)
HGB BLD-MCNC: 13 G/DL (ref 11.7–15.7)
IMM GRANULOCYTES # BLD: 0.1 10E9/L (ref 0–0.4)
IMM GRANULOCYTES NFR BLD: 0.5 %
LABORATORY COMMENT REPORT: NORMAL
LYMPHOCYTES # BLD AUTO: 1.3 10E9/L (ref 0.8–5.3)
LYMPHOCYTES NFR BLD AUTO: 12.2 %
MCH RBC QN AUTO: 27.8 PG (ref 26.5–33)
MCHC RBC AUTO-ENTMCNC: 31.9 G/DL (ref 31.5–36.5)
MCV RBC AUTO: 87 FL (ref 78–100)
MONOCYTES # BLD AUTO: 0.5 10E9/L (ref 0–1.3)
MONOCYTES NFR BLD AUTO: 4.6 %
NEUTROPHILS # BLD AUTO: 8.9 10E9/L (ref 1.6–8.3)
NEUTROPHILS NFR BLD AUTO: 81.9 %
NRBC # BLD AUTO: 0 10*3/UL
NRBC BLD AUTO-RTO: 0 /100
PLATELET # BLD AUTO: 171 10E9/L (ref 150–450)
RBC # BLD AUTO: 4.68 10E12/L (ref 3.8–5.2)
RUPTURE OF FETAL MEMBRANES BY ROM PLUS: POSITIVE
SARS-COV-2 RNA RESP QL NAA+PROBE: NEGATIVE
SPECIMEN EXP DATE BLD: NORMAL
SPECIMEN SOURCE: NORMAL
T PALLIDUM AB SER QL: NONREACTIVE
WBC # BLD AUTO: 10.9 10E9/L (ref 4–11)

## 2021-03-11 PROCEDURE — 86850 RBC ANTIBODY SCREEN: CPT | Performed by: OBSTETRICS & GYNECOLOGY

## 2021-03-11 PROCEDURE — 258N000003 HC RX IP 258 OP 636: Performed by: OBSTETRICS & GYNECOLOGY

## 2021-03-11 PROCEDURE — 250N000009 HC RX 250: Performed by: OBSTETRICS & GYNECOLOGY

## 2021-03-11 PROCEDURE — 86901 BLOOD TYPING SEROLOGIC RH(D): CPT | Performed by: OBSTETRICS & GYNECOLOGY

## 2021-03-11 PROCEDURE — 250N000013 HC RX MED GY IP 250 OP 250 PS 637: Performed by: OBSTETRICS & GYNECOLOGY

## 2021-03-11 PROCEDURE — 82565 ASSAY OF CREATININE: CPT | Performed by: OBSTETRICS & GYNECOLOGY

## 2021-03-11 PROCEDURE — 0HQ9XZZ REPAIR PERINEUM SKIN, EXTERNAL APPROACH: ICD-10-PCS | Performed by: OBSTETRICS & GYNECOLOGY

## 2021-03-11 PROCEDURE — 36415 COLL VENOUS BLD VENIPUNCTURE: CPT | Performed by: OBSTETRICS & GYNECOLOGY

## 2021-03-11 PROCEDURE — 86900 BLOOD TYPING SEROLOGIC ABO: CPT | Performed by: OBSTETRICS & GYNECOLOGY

## 2021-03-11 PROCEDURE — 85025 COMPLETE CBC W/AUTO DIFF WBC: CPT | Performed by: OBSTETRICS & GYNECOLOGY

## 2021-03-11 PROCEDURE — 86780 TREPONEMA PALLIDUM: CPT | Performed by: OBSTETRICS & GYNECOLOGY

## 2021-03-11 PROCEDURE — 84450 TRANSFERASE (AST) (SGOT): CPT | Performed by: OBSTETRICS & GYNECOLOGY

## 2021-03-11 PROCEDURE — 84112 EVAL AMNIOTIC FLUID PROTEIN: CPT | Performed by: OBSTETRICS & GYNECOLOGY

## 2021-03-11 PROCEDURE — 59025 FETAL NON-STRESS TEST: CPT

## 2021-03-11 PROCEDURE — C9803 HOPD COVID-19 SPEC COLLECT: HCPCS

## 2021-03-11 PROCEDURE — 84460 ALANINE AMINO (ALT) (SGPT): CPT | Performed by: OBSTETRICS & GYNECOLOGY

## 2021-03-11 PROCEDURE — G0463 HOSPITAL OUTPT CLINIC VISIT: HCPCS | Mod: 25

## 2021-03-11 PROCEDURE — 120N000012 HC R&B POSTPARTUM

## 2021-03-11 PROCEDURE — 722N000001 HC LABOR CARE VAGINAL DELIVERY SINGLE

## 2021-03-11 PROCEDURE — 87635 SARS-COV-2 COVID-19 AMP PRB: CPT | Performed by: OBSTETRICS & GYNECOLOGY

## 2021-03-11 RX ORDER — NALOXONE HYDROCHLORIDE 0.4 MG/ML
0.2 INJECTION, SOLUTION INTRAMUSCULAR; INTRAVENOUS; SUBCUTANEOUS
Status: DISCONTINUED | OUTPATIENT
Start: 2021-03-11 | End: 2021-03-11

## 2021-03-11 RX ORDER — DIPHENHYDRAMINE HCL 25 MG
25 TABLET ORAL EVERY 6 HOURS PRN
Status: ON HOLD | COMMUNITY
End: 2021-03-12

## 2021-03-11 RX ORDER — ONDANSETRON 2 MG/ML
4 INJECTION INTRAMUSCULAR; INTRAVENOUS EVERY 6 HOURS PRN
Status: DISCONTINUED | OUTPATIENT
Start: 2021-03-11 | End: 2021-03-11

## 2021-03-11 RX ORDER — METHYLERGONOVINE MALEATE 0.2 MG/ML
200 INJECTION INTRAVENOUS
Status: DISCONTINUED | OUTPATIENT
Start: 2021-03-11 | End: 2021-03-11

## 2021-03-11 RX ORDER — OXYTOCIN/0.9 % SODIUM CHLORIDE 30/500 ML
340 PLASTIC BAG, INJECTION (ML) INTRAVENOUS CONTINUOUS PRN
Status: DISCONTINUED | OUTPATIENT
Start: 2021-03-11 | End: 2021-03-13 | Stop reason: HOSPADM

## 2021-03-11 RX ORDER — NALOXONE HYDROCHLORIDE 0.4 MG/ML
0.4 INJECTION, SOLUTION INTRAMUSCULAR; INTRAVENOUS; SUBCUTANEOUS
Status: DISCONTINUED | OUTPATIENT
Start: 2021-03-11 | End: 2021-03-11

## 2021-03-11 RX ORDER — AMOXICILLIN 250 MG
2 CAPSULE ORAL 2 TIMES DAILY
Status: DISCONTINUED | OUTPATIENT
Start: 2021-03-11 | End: 2021-03-13 | Stop reason: HOSPADM

## 2021-03-11 RX ORDER — CETIRIZINE HYDROCHLORIDE 10 MG/1
10 TABLET ORAL DAILY
COMMUNITY

## 2021-03-11 RX ORDER — OXYTOCIN 10 [USP'U]/ML
10 INJECTION, SOLUTION INTRAMUSCULAR; INTRAVENOUS
Status: DISCONTINUED | OUTPATIENT
Start: 2021-03-11 | End: 2021-03-13 | Stop reason: HOSPADM

## 2021-03-11 RX ORDER — OXYTOCIN/0.9 % SODIUM CHLORIDE 30/500 ML
100-340 PLASTIC BAG, INJECTION (ML) INTRAVENOUS CONTINUOUS PRN
Status: COMPLETED | OUTPATIENT
Start: 2021-03-11 | End: 2021-03-11

## 2021-03-11 RX ORDER — ACETAMINOPHEN 325 MG/1
650 TABLET ORAL EVERY 4 HOURS PRN
Status: DISCONTINUED | OUTPATIENT
Start: 2021-03-11 | End: 2021-03-11

## 2021-03-11 RX ORDER — AMOXICILLIN 250 MG
1 CAPSULE ORAL 2 TIMES DAILY
Status: DISCONTINUED | OUTPATIENT
Start: 2021-03-11 | End: 2021-03-13 | Stop reason: HOSPADM

## 2021-03-11 RX ORDER — OXYTOCIN/0.9 % SODIUM CHLORIDE 30/500 ML
1-24 PLASTIC BAG, INJECTION (ML) INTRAVENOUS CONTINUOUS
Status: DISCONTINUED | OUTPATIENT
Start: 2021-03-11 | End: 2021-03-11

## 2021-03-11 RX ORDER — IBUPROFEN 400 MG/1
800 TABLET, FILM COATED ORAL
Status: COMPLETED | OUTPATIENT
Start: 2021-03-11 | End: 2021-03-11

## 2021-03-11 RX ORDER — TRANEXAMIC ACID 10 MG/ML
1 INJECTION, SOLUTION INTRAVENOUS EVERY 30 MIN PRN
Status: DISCONTINUED | OUTPATIENT
Start: 2021-03-11 | End: 2021-03-11

## 2021-03-11 RX ORDER — SODIUM CHLORIDE, SODIUM LACTATE, POTASSIUM CHLORIDE, CALCIUM CHLORIDE 600; 310; 30; 20 MG/100ML; MG/100ML; MG/100ML; MG/100ML
INJECTION, SOLUTION INTRAVENOUS CONTINUOUS
Status: DISCONTINUED | OUTPATIENT
Start: 2021-03-11 | End: 2021-03-11

## 2021-03-11 RX ORDER — BISACODYL 10 MG
10 SUPPOSITORY, RECTAL RECTAL DAILY PRN
Status: DISCONTINUED | OUTPATIENT
Start: 2021-03-13 | End: 2021-03-13 | Stop reason: HOSPADM

## 2021-03-11 RX ORDER — MODIFIED LANOLIN
OINTMENT (GRAM) TOPICAL
Status: DISCONTINUED | OUTPATIENT
Start: 2021-03-11 | End: 2021-03-13 | Stop reason: HOSPADM

## 2021-03-11 RX ORDER — OXYTOCIN 10 [USP'U]/ML
10 INJECTION, SOLUTION INTRAMUSCULAR; INTRAVENOUS
Status: DISCONTINUED | OUTPATIENT
Start: 2021-03-11 | End: 2021-03-11

## 2021-03-11 RX ORDER — OXYTOCIN/0.9 % SODIUM CHLORIDE 30/500 ML
100 PLASTIC BAG, INJECTION (ML) INTRAVENOUS CONTINUOUS
Status: DISCONTINUED | OUTPATIENT
Start: 2021-03-11 | End: 2021-03-13 | Stop reason: HOSPADM

## 2021-03-11 RX ORDER — ACETAMINOPHEN 325 MG/1
650 TABLET ORAL EVERY 4 HOURS PRN
Status: DISCONTINUED | OUTPATIENT
Start: 2021-03-11 | End: 2021-03-13 | Stop reason: HOSPADM

## 2021-03-11 RX ORDER — OXYCODONE AND ACETAMINOPHEN 5; 325 MG/1; MG/1
1 TABLET ORAL
Status: DISCONTINUED | OUTPATIENT
Start: 2021-03-11 | End: 2021-03-11

## 2021-03-11 RX ORDER — LIDOCAINE 40 MG/G
CREAM TOPICAL
Status: DISCONTINUED | OUTPATIENT
Start: 2021-03-11 | End: 2021-03-11

## 2021-03-11 RX ORDER — TRANEXAMIC ACID 10 MG/ML
1 INJECTION, SOLUTION INTRAVENOUS EVERY 30 MIN PRN
Status: DISCONTINUED | OUTPATIENT
Start: 2021-03-11 | End: 2021-03-13 | Stop reason: HOSPADM

## 2021-03-11 RX ORDER — IBUPROFEN 400 MG/1
800 TABLET, FILM COATED ORAL EVERY 6 HOURS PRN
Status: DISCONTINUED | OUTPATIENT
Start: 2021-03-11 | End: 2021-03-13 | Stop reason: HOSPADM

## 2021-03-11 RX ORDER — CARBOPROST TROMETHAMINE 250 UG/ML
250 INJECTION, SOLUTION INTRAMUSCULAR
Status: DISCONTINUED | OUTPATIENT
Start: 2021-03-11 | End: 2021-03-11

## 2021-03-11 RX ORDER — LACTOBACILLUS RHAMNOSUS GG 10B CELL
1 CAPSULE ORAL 2 TIMES DAILY
COMMUNITY

## 2021-03-11 RX ORDER — HYDROCORTISONE 2.5 %
CREAM (GRAM) TOPICAL 3 TIMES DAILY PRN
Status: DISCONTINUED | OUTPATIENT
Start: 2021-03-11 | End: 2021-03-13 | Stop reason: HOSPADM

## 2021-03-11 RX ADMIN — ACETAMINOPHEN 650 MG: 325 TABLET, FILM COATED ORAL at 21:01

## 2021-03-11 RX ADMIN — LIDOCAINE HYDROCHLORIDE 20 ML: 10 INJECTION, SOLUTION INFILTRATION; PERINEURAL at 20:36

## 2021-03-11 RX ADMIN — Medication 340 ML/HR: at 20:28

## 2021-03-11 RX ADMIN — IBUPROFEN 800 MG: 400 TABLET ORAL at 21:01

## 2021-03-11 RX ADMIN — Medication 2 MILLI-UNITS/MIN: at 15:07

## 2021-03-11 RX ADMIN — Medication 100 ML/HR: at 21:02

## 2021-03-11 RX ADMIN — SODIUM CHLORIDE, POTASSIUM CHLORIDE, SODIUM LACTATE AND CALCIUM CHLORIDE: 600; 310; 30; 20 INJECTION, SOLUTION INTRAVENOUS at 15:04

## 2021-03-11 NOTE — PLAN OF CARE
1340 - Report received from ELZBIETA Erazo RN.  All cares assumed.  Patient is planning an unmedicated delivery and would like to hold on pitocin as long as possible.  Patient willing to start pitocin at 1500 is cervix is unchanged and contractions are not increasing in intensity or frequency.  POC discussed including pitocin, as described above, lab draw, COVID swab, IV if pitocin needed.  Patient prefers to not have an IV unless necessary.  Intermittent montoring is currently ordered by MD.  Plan to reapply monitors at 1440.  Patient verbally consents to POC.    1355 - Covid swab discussed with patient.  Patient verbally consents.  Covid swab collected and sent to lab.      1400 - Patient up on birthing ball per her request.  Patient reports frequent contractions that are tolerable at this time.

## 2021-03-11 NOTE — PLAN OF CARE
Lisa is a 29yo  40w0d presents with SROM clear fluid at 0800. Denies problems with this pregnancy, bleeding HA, vision changes or epigastric pain. Denies exposure or symptoms of COVID. Has had irregular cramping in her lower back.     ROM plus collected and sent. SVE 3-4cm/70%/-2    ROM plus positive.     1321-Dr Dial pagelow.

## 2021-03-11 NOTE — PLAN OF CARE
1430 - FHT's by doppler at 125 with no accels or decels noted.  Patient request pitocin to be started.  Della monitor discussed with patient and patient request to try the della.    1445 - Abdomen prepped for della monitor.  Della monitor applied.     1507 - Pitocin started with patient request per MD order.    1515 - Report given to Nafisa Neri RN who will assume all cares.

## 2021-03-11 NOTE — H&P
OB Admission History and Physical    HPI:  Patient is a 30 year old female who presents to Labor and Delivery at 40w0d for SROM at about 8 am, and ctx. Denies VB> +FM. Her pregnancy has been largely uncomplicated - manageable HA, and anxiety without medications. She declined genetic screening. Her pregnancy has been complicated by:    Patient Active Problem List   Diagnosis     Allergic rhinitis due to other allergen     Generalized hyperhidrosis      (normal spontaneous vaginal delivery)     Labor and delivery, indication for care         Prenatal Lab Results:     Results for ENRIQUE DAY (MRN [id number]) as of 3/11/2021 17:27   Ref. Range 3/5/2019 10:17 2020 00:00   ABO Unknown O    RH(D) Unknown Pos    Antibody Screen Unknown Neg    Test Valid Only At Latest Units:     [facility]    Specimen Expires Unknown 2019    Treponema Antibodies Unknown Nonreactive NR   Rubella Antibody IgG Quantitative Latest Units: IU/mL  41   Hep B Surface Agn Unknown  NR   HIV Antigen Antibody Combo Unknown  NR         GBS: negative  1 hour GTT: 83  3 hour GTT: n/a      OB History    Para Term  AB Living   3 2 2 0 0 2   SAB TAB Ectopic Multiple Live Births   0 0 0 0 2      # Outcome Date GA Lbr Sabino/2nd Weight Sex Delivery Anes PTL Lv   3 Current            2 Term 19 40w6d 03:15 / 00:12 3.6 kg (7 lb 15 oz) F Vag-Spont Local N CHULA      Name: ALBAN TINEO      Apgar1: 8  Apgar5: 9   1 Term 16   3.771 kg (8 lb 5 oz) F Vag-Spont None N CHULA       Past Surgical History:   Procedure Laterality Date     HC TOOTH EXTRACTION W/FORCEP         Past Medical History:   Diagnosis Date     Allergic rhinitis due to other allergen     has been tested, cats, dogs, seasonal     Generalized hyperhidrosis      Other acne      Other atopic dermatitis and related conditions        Social History     Socioeconomic History     Marital status:      Spouse name: None      Number of children: None     Years of education: None     Highest education level: None   Occupational History     None   Social Needs     Financial resource strain: None     Food insecurity     Worry: None     Inability: None     Transportation needs     Medical: None     Non-medical: None   Tobacco Use     Smoking status: Never Smoker     Smokeless tobacco: Never Used   Substance and Sexual Activity     Alcohol use: No     Drug use: No     Sexual activity: Yes     Partners: Male   Lifestyle     Physical activity     Days per week: None     Minutes per session: None     Stress: None   Relationships     Social connections     Talks on phone: None     Gets together: None     Attends Muslim service: None     Active member of club or organization: None     Attends meetings of clubs or organizations: None     Relationship status: None     Intimate partner violence     Fear of current or ex partner: None     Emotionally abused: None     Physically abused: None     Forced sexual activity: None   Other Topics Concern     None   Social History Narrative     None       Medications:    Medications Prior to Admission   Medication Sig Dispense Refill Last Dose     doxylamine (UNISOM) 25 MG TABS tablet Take 25 mg by mouth At Bedtime   3/10/2021 at Unknown time     lactobacillus rhamnosus, GG, (CULTURELL) capsule Take 1 capsule by mouth 2 times daily   Past Week at Unknown time     omeprazole (PRILOSEC) 20 MG DR capsule Take 20 mg by mouth daily   Past Week at Unknown time     Prenatal Vit-Fe Fumarate-FA (PRENATAL MULTIVITAMIN PLUS IRON) 27-0.8 MG TABS per tablet Take 1 tablet by mouth daily   3/10/2021 at Unknown time     cetirizine (ZYRTEC) 10 MG tablet Take 10 mg by mouth daily   More than a month at Unknown time     diphenhydrAMINE (BENADRYL) 25 MG tablet Take 25 mg by mouth every 6 hours as needed for itching or allergies   More than a month at Unknown time       Allergies:    Patient has no known allergies.    Review  of Systems:  A comprehensive review of systems was negative except for those noted in HPI    Physical Examination:  Current Inpatient Vital Signs: Blood pressure 129/75, temperature 99.9  F (37.7  C), temperature source Temporal, resp. rate 16, unknown if currently breastfeeding.    General: A/Ox3 NAD, breathing through contractions  Lungs:  Normal effort  Heart:  reglar rate  Abdomen:  Gravid, NTTP.   Estimated Fetal Weight: 3600g  Extremities:  Normal, NTTP  Skin:  normal    Cervical Exam:  4/80/-1    BPM: 125  Variability:  moderate.  Accelerations:  present.  Decelerations:  absent.  Contractions:  present, frequency q3-4.  Overall assessment:  Category 1      Results for ENRIQUE DAY (MRN [id number]) as of 3/11/2021 17:27   Ref. Range 3/11/2021 12:50 3/11/2021 13:54 3/11/2021 13:55   Creatinine Latest Ref Range: 0.52 - 1.04 mg/dL  0.73    GFR Estimate Latest Ref Range: >60 mL/min/1.73_m2  >90    GFR Estimate If Black Latest Ref Range: >60 mL/min/1.73_m2  >90    ALT Latest Ref Range: 0 - 50 U/L  20    AST Latest Ref Range: 0 - 45 U/L  20    Rupture of Fetal Membranes by ROM Plus Latest Ref Range: NEG^Negative  Positive (A)     WBC Latest Ref Range: 4.0 - 11.0 10e9/L  10.9    Hemoglobin Latest Ref Range: 11.7 - 15.7 g/dL  13.0    Hematocrit Latest Ref Range: 35.0 - 47.0 %  40.7    Platelet Count Latest Ref Range: 150 - 450 10e9/L  171    RBC Count Latest Ref Range: 3.8 - 5.2 10e12/L  4.68    MCV Latest Ref Range: 78 - 100 fl  87    MCH Latest Ref Range: 26.5 - 33.0 pg  27.8    MCHC Latest Ref Range: 31.5 - 36.5 g/dL  31.9    RDW Latest Ref Range: 10.0 - 15.0 %  13.0    Diff Method Unknown  Automated Method    % Neutrophils Latest Units: %  81.9    % Lymphocytes Latest Units: %  12.2    % Monocytes Latest Units: %  4.6    % Eosinophils Latest Units: %  0.5    % Basophils Latest Units: %  0.3    % Immature Granulocytes Latest Units: %  0.5    Nucleated RBCs Latest Ref Range: 0 /100  0    Absolute  Neutrophil Latest Ref Range: 1.6 - 8.3 10e9/L  8.9 (H)    Absolute Lymphocytes Latest Ref Range: 0.8 - 5.3 10e9/L  1.3    Absolute Monocytes Latest Ref Range: 0.0 - 1.3 10e9/L  0.5    Absolute Eosinophils Latest Ref Range: 0.0 - 0.7 10e9/L  0.1    Absolute Basophils Latest Ref Range: 0.0 - 0.2 10e9/L  0.0    Abs Immature Granulocytes Latest Ref Range: 0 - 0.4 10e9/L  0.1    Absolute Nucleated RBC Unknown  0.0    ABO Unknown  O    RH(D) Unknown  Pos    Antibody Screen Unknown  Neg    Test Valid Only At Latest Units:      [facility]    Specimen Expires Unknown  2021    SARS-CoV-2 Virus Specimen Source Unknown   Nasopharyngeal   SARS-CoV-2 PCR Result Unknown   NEGATIVE       Assessment/Plan:  , intrauterine pregnancy at 40w0d with KIMBERLY of 3/11/2021    1. Admit to Labor and Delivery for SROM and labor management   - Augment with pitocin if no cervical change and increase per protocol.   - anticipate    2. GBS negative.  Prophylaxis: not  3. Elevated BP on admission: CBC and PIH labs normal. Urine P:C not completed as she is ruptured and would not  at this time. Continue to monitor. If develops severe range BP then start magnesium for seizure ppx.   4. Rubella immune  5. Blood type O pos; Rhogam not indicated  6. COVID screen negative.      Alley Dial MD  [phone]  21 5:24 PM

## 2021-03-12 VITALS
SYSTOLIC BLOOD PRESSURE: 113 MMHG | OXYGEN SATURATION: 100 % | RESPIRATION RATE: 16 BRPM | HEART RATE: 74 BPM | DIASTOLIC BLOOD PRESSURE: 69 MMHG | TEMPERATURE: 97.9 F

## 2021-03-12 PROCEDURE — 250N000013 HC RX MED GY IP 250 OP 250 PS 637: Performed by: OBSTETRICS & GYNECOLOGY

## 2021-03-12 RX ORDER — NALOXONE HYDROCHLORIDE 0.4 MG/ML
0.2 INJECTION, SOLUTION INTRAMUSCULAR; INTRAVENOUS; SUBCUTANEOUS
Status: DISCONTINUED | OUTPATIENT
Start: 2021-03-12 | End: 2021-03-13 | Stop reason: HOSPADM

## 2021-03-12 RX ORDER — CALCIUM CARBONATE 500 MG/1
1000 TABLET, CHEWABLE ORAL EVERY 4 HOURS PRN
Status: DISCONTINUED | OUTPATIENT
Start: 2021-03-12 | End: 2021-03-13 | Stop reason: HOSPADM

## 2021-03-12 RX ORDER — NALOXONE HYDROCHLORIDE 0.4 MG/ML
0.4 INJECTION, SOLUTION INTRAMUSCULAR; INTRAVENOUS; SUBCUTANEOUS
Status: DISCONTINUED | OUTPATIENT
Start: 2021-03-12 | End: 2021-03-13 | Stop reason: HOSPADM

## 2021-03-12 RX ORDER — OXYCODONE HYDROCHLORIDE 5 MG/1
5 TABLET ORAL EVERY 4 HOURS PRN
Status: DISCONTINUED | OUTPATIENT
Start: 2021-03-12 | End: 2021-03-13 | Stop reason: HOSPADM

## 2021-03-12 RX ADMIN — IBUPROFEN 800 MG: 400 TABLET ORAL at 22:17

## 2021-03-12 RX ADMIN — CALCIUM CARBONATE (ANTACID) CHEW TAB 500 MG 1000 MG: 500 CHEW TAB at 17:56

## 2021-03-12 RX ADMIN — SENNOSIDES AND DOCUSATE SODIUM 1 TABLET: 8.6; 5 TABLET ORAL at 20:07

## 2021-03-12 RX ADMIN — ACETAMINOPHEN 650 MG: 325 TABLET, FILM COATED ORAL at 03:28

## 2021-03-12 RX ADMIN — ACETAMINOPHEN 650 MG: 325 TABLET, FILM COATED ORAL at 16:29

## 2021-03-12 RX ADMIN — SENNOSIDES AND DOCUSATE SODIUM 1 TABLET: 8.6; 5 TABLET ORAL at 08:00

## 2021-03-12 RX ADMIN — IBUPROFEN 800 MG: 400 TABLET ORAL at 09:35

## 2021-03-12 RX ADMIN — OXYCODONE HYDROCHLORIDE 5 MG: 5 TABLET ORAL at 11:00

## 2021-03-12 RX ADMIN — ACETAMINOPHEN 650 MG: 325 TABLET, FILM COATED ORAL at 22:17

## 2021-03-12 RX ADMIN — ACETAMINOPHEN 650 MG: 325 TABLET, FILM COATED ORAL at 09:34

## 2021-03-12 RX ADMIN — IBUPROFEN 800 MG: 400 TABLET ORAL at 16:29

## 2021-03-12 RX ADMIN — IBUPROFEN 800 MG: 400 TABLET ORAL at 03:28

## 2021-03-12 NOTE — L&D DELIVERY NOTE
OB Vaginal Delivery Note    Lisa Escalante MRN# [id number]   Age: 30 year old YOB: 1990       GA: 40w0d  GP:   Labor Complications: None   EBL: 75  mL  Delivery QBL:    Delivery Type: Vaginal, Spontaneous   ROM to Delivery Time: (Delivered) Hours: 12 Minutes: 25  [place] Weight:     1 Minute 5 Minute 10 Minute   Apgar Totals:   8   9      MADAI QIU;MARTÍN ZHANG;PHILOMENA MARIE     Delivery Details:  Lisa Escalante, a 30 year old  female delivered a viable infant with apgars of  8  And 9  . Patient was admitted with PROM and augmented with pitocin.  Delivery was via vaginal, spontaneous  to a sterile field under no anesthesia. Infant delivered in vertex  left  occiput  anterior  position with compound left hand and a double nuchal cord which was reduced at delivery. Anterior and posterior shoulders delivered without difficulty. The cord was clamped, cut twice and   3 vessels were noted. Cord blood was obtained in routine fashion with the following disposition:  .      Cord complications:  Double nuchal cord, loose, reduced at delivery of fetal head.   Placenta delivered at 3/11/2021  8:30 PM . Placental disposition was Pathology . Fundal massage performed and fundus found to be firm.     Episiotomy: none    Perineum, vagina, cervix were inspected, and the following lacerations were noted:   Perineal lacerations: 1st                Any lacerations were repaired in the usual fashion using 3-0 vicryl sutures.    Excellent hemostasis was noted. Needle count correct. Infant and patient in delivery room in good and stable condition.        Miko Escalante-Lisa [[id number]]    Labor Event Times    Labor onset date: 3/11/21 Onset time:  5:10 PM   Dilation complete date: 3/11/21 Complete time:  8:20 PM   Start pushing date/time: 3/11/2021 2023      Labor Events     labor?: No   steroids: None  Labor Type: Spontaneous,  Augmentation  Predominate monitoring during 1st stage: continuous electronic fetal monitoring     Rupture date/time: 3/11/21 0800   Rupture type: Spontaneous rupture of membranes occuring during spontaneous labor or augmentation  Fluid color: Clear  Fluid odor: Normal     Augmentation: Oxytocin     Delivery/Placenta Date and Time    Delivery Date: 3/11/21 Delivery Time:  8:25 PM   Placenta Date/Time: 3/11/2021  8:30 PM  Oxytocin given at the time of delivery: after delivery of baby     Vaginal Counts     Initial count performed by 2 team members:  Two Team Members   MANASA Castro       Ewen Suture Ewen Sponges Instruments   Initial counts 2 0 5    Added to count 0 1 0    Final counts 2 1 5    Placed during labor Accounted for at the end of labor   NA NA   NA NA   NA NA    Final count performed by 2 team members:  Two Team Members   MANASA Castro         Labor Events and Shoulder Dystocia    Fetal Tracing Prior to Delivery: Category 1  Shoulder dystocia present?: Neg     Delivery (Maternal) (Provider to Complete) ([id number])    Episiotomy: None  Perineal lacerations: 1st Repaired?: Yes   Est. blood loss (mL): 75  Number of repair packets: 1     Blood Loss  Mother: Lisa Escalante #[id number]   Start of Mother's Information    IO Blood Loss  03/11/21 1710 - 03/11/21 2045    EBL (mL) Hospital Encounter 75 mL    Total  75 mL         End of Mother's Information  Mother: Lisa Escalante #[id number]          Delivery - Provider to Complete (330251)    Delivering clinician: Juana Castro MD  Attempted Delivery Types (Choose all that apply): Spontaneous Vaginal Delivery  Delivery Type (Choose the 1 that will go to the Birth History): Vaginal, Spontaneous                   Other personnel:  Provider Role   Nadia Jonas RN Delivery Nurse   Juana Castro MD Obstetrician   Juanita Cooper RN Registered Nurse                Placenta    Delayed Cord  Clamping: Done  Date/Time: 3/11/2021  8:30 PM  Removal: Spontaneous  Disposition: Pathology           Anesthesia    Method: Local                Presentation and Position    Presentation: Vertex    Position: Left Occiput Anterior                 Juana Raysa Castro MD

## 2021-03-12 NOTE — PLAN OF CARE
Data: Lisa Escalante transferred to [facility] via wheelchair at 2225. Baby transferred via parent's arms.  Action: Receiving unit notified of transfer: Yes. Patient and family notified of room change. Report given to Ghada Harden at 2225. Belongings sent to receiving unit. Accompanied by Registered Nurse. Oriented patient to surroundings. Call light within reach. ID bands double-checked with receiving RN.  Response: Patient tolerated transfer and is stable.

## 2021-03-12 NOTE — PLAN OF CARE
Vital signs stable. Patient voiding without difficulty. Able to ambulate in room free of dizziness. Taking tylenol/ibuprofen/oxycodone for pain management. Working on breastfeeding infant every 2-3 hours. Encouraged to call with questions/concerns. Will continue to monitor.

## 2021-03-12 NOTE — LACTATION NOTE
Routine Lactation visit with Lisa, significant other Humphrey & baby girl Stormy. Getting ready for discharge later tonight if baby's 24 hour screens are good. Lisa reports feeding is going well so far, but does share her right nipple has a blister that's formed. She's pretty sure this is from a shallow latch last night. She feels in general baby latches well and has been doing better with latching than her other two children did in the beginning. Hydrogels given. Reviewed use, including but not limited to: using hydrogels or nipple ointment and avoiding concurrent use; use for 24 hours and then discarding set and replacing as needed; discarding hydrogels prior to 24 hours if they become cloudy or discolored; storing opened hydrogels in clean dry location. Encouraged good hand hygeine. Encouraged wiping nipples prior to feeding with clean cloth. Reviewed signs of further nipple damage or infection and encouraged lactation follow up as needed.    Discussed cluster feeding, what it is and when to expect it, The Second Night, satiety cues, feeding cues, and reviewed Feeding Log for home use.     Reviewed milk supply and engorgement. Reviewed typical timeline of milk supply initiation and progression over first 3-5 days postpartum. Discussed comfort measures for engorgement, plugged duct treatment, and warning signs of breast infection. Discussed using breast pump in preparation for return to work. Discussed milk storage, introducing a bottle, and general recommendation to wait to start pumping for milk storage or bottle feeding in preparation for return to work until breastfeeding is well established in 3-4 weeks. Exceptions: if feeding is poor or baby needs to supplement for medical reasons.    Feeding plan: Recommend unlimited, frequent breast feedings: At least 8 - 12 times every 24 hours. Avoid pacifiers and supplementation with formula unless medically indicated. Encouraged use of feeding log and to record  feedings, and void/stool patterns. Lisa has a breast pump for home use. Follow up with Alexis Bullock. Reviewed outpatient lactation resources. Lisa & Humphrey appreciative of visit.    Kim Bauer RN-C, IBCLC, MNN, PHN, BSN

## 2021-03-12 NOTE — PLAN OF CARE
VSS. Fundus firm, midline U/2 with scant flow. Initially had light to moderate flow with clots. Improved after voiding. Pain well controlled utilizing tylenol/ibuprofen. Up independently in room.  Working on breastfeeding  and  cares. Progressing per care plan. Continue to monitor and notify MD as needed.

## 2021-03-12 NOTE — PLAN OF CARE
of viable baby girl with Dr Castro at bedside for delivery. Apgars 8/9. First degree lac repaired. See delivery summary for more information. Fundus firm and down 2, light rubra. Taking motrin and tylenol with adequate pain control. Working on breastfeeding. Plan to transfer pt to post partum after recovery is complete.

## 2021-03-12 NOTE — PROGRESS NOTES
Lisa Simeon Boris  2021    S: pt doing well. Sore and cramping.  Ambulating without difficulty.  Tolerating po intake.  Decreased lochia.  Breast feeding.    O: /63   Pulse 74   Temp 98  F (36.7  C) (Oral)   Resp 16   SpO2 100%   Breastfeeding Unknown   Recent Labs   Lab 21  1354   HGB 13.0     Abdomen: soft, non tender, fundus firm below the umbilicus  Ext: non tender, no edema or erythema    A/P: s/p  PPD #1  Doing well  Continue routine post partum care  Discharge planning: pt would like to try to go home today.  Will put in order and cancel if needed.     Jose Batista MD

## 2021-03-13 NOTE — PLAN OF CARE
Tylenol and ibuprofen given before discharge.  Discharge paperwork given and discussed.  Answered all questions.  Walked down to car with  at side.

## 2021-05-29 ENCOUNTER — HEALTH MAINTENANCE LETTER (OUTPATIENT)
Age: 31
End: 2021-05-29

## 2021-09-18 ENCOUNTER — HEALTH MAINTENANCE LETTER (OUTPATIENT)
Age: 31
End: 2021-09-18

## 2022-06-25 ENCOUNTER — HEALTH MAINTENANCE LETTER (OUTPATIENT)
Age: 32
End: 2022-06-25

## 2022-11-20 ENCOUNTER — HEALTH MAINTENANCE LETTER (OUTPATIENT)
Age: 32
End: 2022-11-20

## 2023-01-30 ENCOUNTER — HOSPITAL ENCOUNTER (INPATIENT)
Facility: CLINIC | Age: 33
LOS: 1 days | Discharge: HOME | End: 2023-01-31
Attending: OBSTETRICS & GYNECOLOGY | Admitting: OBSTETRICS & GYNECOLOGY
Payer: COMMERCIAL

## 2023-01-30 LAB
ABO/RH(D): NORMAL
ANTIBODY SCREEN: NEGATIVE
SPECIMEN EXPIRATION DATE: NORMAL
T PALLIDUM AB SER QL: NONREACTIVE

## 2023-01-30 PROCEDURE — 120N000012 HC R&B POSTPARTUM

## 2023-01-30 PROCEDURE — 258N000003 HC RX IP 258 OP 636: Performed by: OBSTETRICS & GYNECOLOGY

## 2023-01-30 PROCEDURE — 250N000013 HC RX MED GY IP 250 OP 250 PS 637: Performed by: OBSTETRICS & GYNECOLOGY

## 2023-01-30 PROCEDURE — 36415 COLL VENOUS BLD VENIPUNCTURE: CPT | Performed by: OBSTETRICS & GYNECOLOGY

## 2023-01-30 PROCEDURE — 722N000001 HC LABOR CARE VAGINAL DELIVERY SINGLE

## 2023-01-30 PROCEDURE — 10D17Z9 MANUAL EXTRACTION OF PRODUCTS OF CONCEPTION, RETAINED, VIA NATURAL OR ARTIFICIAL OPENING: ICD-10-PCS | Performed by: OBSTETRICS & GYNECOLOGY

## 2023-01-30 PROCEDURE — 250N000009 HC RX 250: Performed by: OBSTETRICS & GYNECOLOGY

## 2023-01-30 PROCEDURE — 0KQM0ZZ REPAIR PERINEUM MUSCLE, OPEN APPROACH: ICD-10-PCS | Performed by: OBSTETRICS & GYNECOLOGY

## 2023-01-30 PROCEDURE — 3E033VJ INTRODUCTION OF OTHER HORMONE INTO PERIPHERAL VEIN, PERCUTANEOUS APPROACH: ICD-10-PCS | Performed by: OBSTETRICS & GYNECOLOGY

## 2023-01-30 PROCEDURE — 86780 TREPONEMA PALLIDUM: CPT | Performed by: OBSTETRICS & GYNECOLOGY

## 2023-01-30 PROCEDURE — 86901 BLOOD TYPING SEROLOGIC RH(D): CPT | Performed by: OBSTETRICS & GYNECOLOGY

## 2023-01-30 PROCEDURE — 250N000011 HC RX IP 250 OP 636: Performed by: OBSTETRICS & GYNECOLOGY

## 2023-01-30 PROCEDURE — 10907ZC DRAINAGE OF AMNIOTIC FLUID, THERAPEUTIC FROM PRODUCTS OF CONCEPTION, VIA NATURAL OR ARTIFICIAL OPENING: ICD-10-PCS | Performed by: OBSTETRICS & GYNECOLOGY

## 2023-01-30 RX ORDER — PENICILLIN G 3000000 [IU]/50ML
3 INJECTION, SOLUTION INTRAVENOUS EVERY 4 HOURS
Status: DISCONTINUED | OUTPATIENT
Start: 2023-01-30 | End: 2023-01-30 | Stop reason: HOSPADM

## 2023-01-30 RX ORDER — MISOPROSTOL 200 UG/1
400 TABLET ORAL
Status: DISCONTINUED | OUTPATIENT
Start: 2023-01-30 | End: 2023-01-30 | Stop reason: HOSPADM

## 2023-01-30 RX ORDER — IBUPROFEN 400 MG/1
800 TABLET, FILM COATED ORAL EVERY 6 HOURS PRN
Status: DISCONTINUED | OUTPATIENT
Start: 2023-01-30 | End: 2023-01-31 | Stop reason: HOSPADM

## 2023-01-30 RX ORDER — IBUPROFEN 400 MG/1
800 TABLET, FILM COATED ORAL
Status: DISCONTINUED | OUTPATIENT
Start: 2023-01-30 | End: 2023-01-31 | Stop reason: HOSPADM

## 2023-01-30 RX ORDER — OXYTOCIN/0.9 % SODIUM CHLORIDE 30/500 ML
1-24 PLASTIC BAG, INJECTION (ML) INTRAVENOUS CONTINUOUS
Status: DISCONTINUED | OUTPATIENT
Start: 2023-01-30 | End: 2023-01-30 | Stop reason: HOSPADM

## 2023-01-30 RX ORDER — OXYCODONE HYDROCHLORIDE 5 MG/1
5 TABLET ORAL EVERY 4 HOURS PRN
Status: DISPENSED | OUTPATIENT
Start: 2023-01-30 | End: 2023-01-31

## 2023-01-30 RX ORDER — CARBOPROST TROMETHAMINE 250 UG/ML
250 INJECTION, SOLUTION INTRAMUSCULAR
Status: DISCONTINUED | OUTPATIENT
Start: 2023-01-30 | End: 2023-01-30 | Stop reason: HOSPADM

## 2023-01-30 RX ORDER — NALOXONE HYDROCHLORIDE 0.4 MG/ML
0.4 INJECTION, SOLUTION INTRAMUSCULAR; INTRAVENOUS; SUBCUTANEOUS
Status: DISCONTINUED | OUTPATIENT
Start: 2023-01-30 | End: 2023-01-30 | Stop reason: HOSPADM

## 2023-01-30 RX ORDER — OXYTOCIN 10 [USP'U]/ML
10 INJECTION, SOLUTION INTRAMUSCULAR; INTRAVENOUS
Status: DISCONTINUED | OUTPATIENT
Start: 2023-01-30 | End: 2023-01-31 | Stop reason: HOSPADM

## 2023-01-30 RX ORDER — NALOXONE HYDROCHLORIDE 0.4 MG/ML
0.2 INJECTION, SOLUTION INTRAMUSCULAR; INTRAVENOUS; SUBCUTANEOUS
Status: DISCONTINUED | OUTPATIENT
Start: 2023-01-30 | End: 2023-01-30 | Stop reason: HOSPADM

## 2023-01-30 RX ORDER — TRANEXAMIC ACID 10 MG/ML
1 INJECTION, SOLUTION INTRAVENOUS EVERY 30 MIN PRN
Status: DISCONTINUED | OUTPATIENT
Start: 2023-01-30 | End: 2023-01-30 | Stop reason: HOSPADM

## 2023-01-30 RX ORDER — OXYTOCIN 10 [USP'U]/ML
10 INJECTION, SOLUTION INTRAMUSCULAR; INTRAVENOUS
Status: DISCONTINUED | OUTPATIENT
Start: 2023-01-30 | End: 2023-01-30 | Stop reason: HOSPADM

## 2023-01-30 RX ORDER — NALOXONE HYDROCHLORIDE 0.4 MG/ML
0.2 INJECTION, SOLUTION INTRAMUSCULAR; INTRAVENOUS; SUBCUTANEOUS
Status: DISCONTINUED | OUTPATIENT
Start: 2023-01-30 | End: 2023-01-31 | Stop reason: HOSPADM

## 2023-01-30 RX ORDER — KETOROLAC TROMETHAMINE 30 MG/ML
30 INJECTION, SOLUTION INTRAMUSCULAR; INTRAVENOUS
Status: DISCONTINUED | OUTPATIENT
Start: 2023-01-30 | End: 2023-01-31 | Stop reason: HOSPADM

## 2023-01-30 RX ORDER — DOCUSATE SODIUM 100 MG/1
100 CAPSULE, LIQUID FILLED ORAL DAILY
Status: DISCONTINUED | OUTPATIENT
Start: 2023-01-30 | End: 2023-01-31 | Stop reason: HOSPADM

## 2023-01-30 RX ORDER — ONDANSETRON 4 MG/1
4 TABLET, ORALLY DISINTEGRATING ORAL EVERY 6 HOURS PRN
Status: DISCONTINUED | OUTPATIENT
Start: 2023-01-30 | End: 2023-01-30 | Stop reason: HOSPADM

## 2023-01-30 RX ORDER — METOCLOPRAMIDE HYDROCHLORIDE 5 MG/ML
10 INJECTION INTRAMUSCULAR; INTRAVENOUS EVERY 6 HOURS PRN
Status: DISCONTINUED | OUTPATIENT
Start: 2023-01-30 | End: 2023-01-30 | Stop reason: HOSPADM

## 2023-01-30 RX ORDER — NALOXONE HYDROCHLORIDE 0.4 MG/ML
0.4 INJECTION, SOLUTION INTRAMUSCULAR; INTRAVENOUS; SUBCUTANEOUS
Status: DISCONTINUED | OUTPATIENT
Start: 2023-01-30 | End: 2023-01-31 | Stop reason: HOSPADM

## 2023-01-30 RX ORDER — CITRIC ACID/SODIUM CITRATE 334-500MG
30 SOLUTION, ORAL ORAL
Status: DISCONTINUED | OUTPATIENT
Start: 2023-01-30 | End: 2023-01-30 | Stop reason: HOSPADM

## 2023-01-30 RX ORDER — LIDOCAINE 40 MG/G
CREAM TOPICAL
Status: DISCONTINUED | OUTPATIENT
Start: 2023-01-30 | End: 2023-01-30 | Stop reason: HOSPADM

## 2023-01-30 RX ORDER — METOCLOPRAMIDE 10 MG/1
10 TABLET ORAL EVERY 6 HOURS PRN
Status: DISCONTINUED | OUTPATIENT
Start: 2023-01-30 | End: 2023-01-30 | Stop reason: HOSPADM

## 2023-01-30 RX ORDER — PROCHLORPERAZINE MALEATE 5 MG
10 TABLET ORAL EVERY 6 HOURS PRN
Status: DISCONTINUED | OUTPATIENT
Start: 2023-01-30 | End: 2023-01-30 | Stop reason: HOSPADM

## 2023-01-30 RX ORDER — PENICILLIN G POTASSIUM 5000000 [IU]/1
5 INJECTION, POWDER, FOR SOLUTION INTRAMUSCULAR; INTRAVENOUS ONCE
Status: COMPLETED | OUTPATIENT
Start: 2023-01-30 | End: 2023-01-30

## 2023-01-30 RX ORDER — MISOPROSTOL 200 UG/1
800 TABLET ORAL
Status: DISCONTINUED | OUTPATIENT
Start: 2023-01-30 | End: 2023-01-30 | Stop reason: HOSPADM

## 2023-01-30 RX ORDER — SODIUM CHLORIDE, SODIUM LACTATE, POTASSIUM CHLORIDE, CALCIUM CHLORIDE 600; 310; 30; 20 MG/100ML; MG/100ML; MG/100ML; MG/100ML
INJECTION, SOLUTION INTRAVENOUS CONTINUOUS PRN
Status: DISCONTINUED | OUTPATIENT
Start: 2023-01-30 | End: 2023-01-30 | Stop reason: HOSPADM

## 2023-01-30 RX ORDER — ACETAMINOPHEN 325 MG/1
650 TABLET ORAL EVERY 4 HOURS PRN
Status: DISCONTINUED | OUTPATIENT
Start: 2023-01-30 | End: 2023-01-31 | Stop reason: HOSPADM

## 2023-01-30 RX ORDER — METHYLERGONOVINE MALEATE 0.2 MG/ML
200 INJECTION INTRAVENOUS
Status: DISCONTINUED | OUTPATIENT
Start: 2023-01-30 | End: 2023-01-31 | Stop reason: HOSPADM

## 2023-01-30 RX ORDER — FENTANYL CITRATE 50 UG/ML
INJECTION, SOLUTION INTRAMUSCULAR; INTRAVENOUS
Status: DISCONTINUED
Start: 2023-01-30 | End: 2023-01-30 | Stop reason: HOSPADM

## 2023-01-30 RX ORDER — MISOPROSTOL 200 UG/1
400 TABLET ORAL
Status: DISCONTINUED | OUTPATIENT
Start: 2023-01-30 | End: 2023-01-31 | Stop reason: HOSPADM

## 2023-01-30 RX ORDER — ONDANSETRON 2 MG/ML
4 INJECTION INTRAMUSCULAR; INTRAVENOUS EVERY 6 HOURS PRN
Status: DISCONTINUED | OUTPATIENT
Start: 2023-01-30 | End: 2023-01-30 | Stop reason: HOSPADM

## 2023-01-30 RX ORDER — OXYTOCIN/0.9 % SODIUM CHLORIDE 30/500 ML
340 PLASTIC BAG, INJECTION (ML) INTRAVENOUS CONTINUOUS PRN
Status: DISCONTINUED | OUTPATIENT
Start: 2023-01-30 | End: 2023-01-31 | Stop reason: HOSPADM

## 2023-01-30 RX ORDER — MISOPROSTOL 200 UG/1
800 TABLET ORAL
Status: DISCONTINUED | OUTPATIENT
Start: 2023-01-30 | End: 2023-01-31 | Stop reason: HOSPADM

## 2023-01-30 RX ORDER — FENTANYL CITRATE 50 UG/ML
100 INJECTION, SOLUTION INTRAMUSCULAR; INTRAVENOUS ONCE
Status: COMPLETED | OUTPATIENT
Start: 2023-01-30 | End: 2023-01-30

## 2023-01-30 RX ORDER — BISACODYL 10 MG
10 SUPPOSITORY, RECTAL RECTAL DAILY PRN
Status: DISCONTINUED | OUTPATIENT
Start: 2023-01-30 | End: 2023-01-31 | Stop reason: HOSPADM

## 2023-01-30 RX ORDER — TRANEXAMIC ACID 10 MG/ML
1 INJECTION, SOLUTION INTRAVENOUS EVERY 30 MIN PRN
Status: DISCONTINUED | OUTPATIENT
Start: 2023-01-30 | End: 2023-01-31 | Stop reason: HOSPADM

## 2023-01-30 RX ORDER — METHYLERGONOVINE MALEATE 0.2 MG/ML
200 INJECTION INTRAVENOUS
Status: DISCONTINUED | OUTPATIENT
Start: 2023-01-30 | End: 2023-01-30 | Stop reason: HOSPADM

## 2023-01-30 RX ORDER — HYDROCORTISONE 25 MG/G
CREAM TOPICAL 3 TIMES DAILY PRN
Status: DISCONTINUED | OUTPATIENT
Start: 2023-01-30 | End: 2023-01-31 | Stop reason: HOSPADM

## 2023-01-30 RX ORDER — SODIUM CHLORIDE, SODIUM LACTATE, POTASSIUM CHLORIDE, CALCIUM CHLORIDE 600; 310; 30; 20 MG/100ML; MG/100ML; MG/100ML; MG/100ML
INJECTION, SOLUTION INTRAVENOUS CONTINUOUS
Status: DISCONTINUED | OUTPATIENT
Start: 2023-01-30 | End: 2023-01-30 | Stop reason: HOSPADM

## 2023-01-30 RX ORDER — OXYTOCIN/0.9 % SODIUM CHLORIDE 30/500 ML
100-340 PLASTIC BAG, INJECTION (ML) INTRAVENOUS CONTINUOUS PRN
Status: DISCONTINUED | OUTPATIENT
Start: 2023-01-30 | End: 2023-01-31 | Stop reason: HOSPADM

## 2023-01-30 RX ORDER — OXYTOCIN/0.9 % SODIUM CHLORIDE 30/500 ML
340 PLASTIC BAG, INJECTION (ML) INTRAVENOUS CONTINUOUS PRN
Status: DISCONTINUED | OUTPATIENT
Start: 2023-01-30 | End: 2023-01-30 | Stop reason: HOSPADM

## 2023-01-30 RX ORDER — PROCHLORPERAZINE 25 MG
25 SUPPOSITORY, RECTAL RECTAL EVERY 12 HOURS PRN
Status: DISCONTINUED | OUTPATIENT
Start: 2023-01-30 | End: 2023-01-30 | Stop reason: HOSPADM

## 2023-01-30 RX ORDER — CARBOPROST TROMETHAMINE 250 UG/ML
250 INJECTION, SOLUTION INTRAMUSCULAR
Status: DISCONTINUED | OUTPATIENT
Start: 2023-01-30 | End: 2023-01-31 | Stop reason: HOSPADM

## 2023-01-30 RX ORDER — MODIFIED LANOLIN
OINTMENT (GRAM) TOPICAL
Status: DISCONTINUED | OUTPATIENT
Start: 2023-01-30 | End: 2023-01-31 | Stop reason: HOSPADM

## 2023-01-30 RX ADMIN — MISOPROSTOL 800 MCG: 200 TABLET ORAL at 15:55

## 2023-01-30 RX ADMIN — PENICILLIN G 3 MILLION UNITS: 3000000 INJECTION, SOLUTION INTRAVENOUS at 13:06

## 2023-01-30 RX ADMIN — IBUPROFEN 800 MG: 400 TABLET ORAL at 22:53

## 2023-01-30 RX ADMIN — SODIUM CHLORIDE, POTASSIUM CHLORIDE, SODIUM LACTATE AND CALCIUM CHLORIDE: 600; 310; 30; 20 INJECTION, SOLUTION INTRAVENOUS at 08:55

## 2023-01-30 RX ADMIN — ACETAMINOPHEN 650 MG: 325 TABLET, FILM COATED ORAL at 17:18

## 2023-01-30 RX ADMIN — Medication 100 ML/HR: at 16:08

## 2023-01-30 RX ADMIN — IBUPROFEN 800 MG: 400 TABLET ORAL at 17:18

## 2023-01-30 RX ADMIN — ACETAMINOPHEN 650 MG: 325 TABLET, FILM COATED ORAL at 22:53

## 2023-01-30 RX ADMIN — PENICILLIN G POTASSIUM 5 MILLION UNITS: 5000000 POWDER, FOR SOLUTION INTRAMUSCULAR; INTRAPLEURAL; INTRATHECAL; INTRAVENOUS at 09:06

## 2023-01-30 RX ADMIN — Medication 2 MILLI-UNITS/MIN: at 08:52

## 2023-01-30 RX ADMIN — FENTANYL CITRATE 100 MCG: 50 INJECTION, SOLUTION INTRAMUSCULAR; INTRAVENOUS at 15:45

## 2023-01-30 ASSESSMENT — ACTIVITIES OF DAILY LIVING (ADL)
ADLS_ACUITY_SCORE: 18
ADLS_ACUITY_SCORE: 35
ADLS_ACUITY_SCORE: 18

## 2023-01-30 NOTE — PLAN OF CARE
Data: Patient admitted to room 212 at 0737. Patient is a . Prenatal record reviewed.   OB History    Para Term  AB Living   4 3 3 0 0 3   SAB IAB Ectopic Multiple Live Births   0 0 0 0 3      # Outcome Date GA Lbr Sabino/2nd Weight Sex Delivery Anes PTL Lv   4 Current            3 Term 21 40w0d 03:10 / 00:05 3.41 kg (7 lb 8.3 oz) F Vag-Spont Local N CHULA      Name: BARBFEMALE-ENRIQUE      Apgar1: 8  Apgar5: 9   2 Term 19 40w6d 03:15 / 00:12 3.6 kg (7 lb 15 oz) F Vag-Spont Local N CHULA      Name: RUT NAYAKFEMALE-ENRIQUE      Apgar1: 8  Apgar5: 9   1 Term 16   3.771 kg (8 lb 5 oz) F Vag-Spont None N CHULA   .  Medical History:   Past Medical History:   Diagnosis Date     Allergic rhinitis due to other allergen     has been tested, cats, dogs, seasonal     Generalized hyperhidrosis      Other acne      Other atopic dermatitis and related conditions      Gestational age 39w1d. Vital signs per doc flowsheet. Fetal movement present. Patient reports Induction Of Labor   as reason for admission. Support persons  Humphrey present.  Action:  Care of patient assumed at 0740. Verbal consent for EFM, external fetal monitors applied. Admission assessment completed. Patient and support persons educated on labor process. Patient instructed to report change in fetal movement, contractions, vaginal leaking of fluid or bleeding, abdominal pain, or any concerns related to the pregnancy to her nurse/physician. Patient oriented to room, call light in reach.   Response: Dr. Dial informed of pts location and current status in labor. Plan per provider is proceed with induction of labor with pitocin and AROM around 1230 this afternoon due to GBS status. Patient verbalized understanding of education and verbalized agreement with plan. Patient coping with labor via breathing techniques.

## 2023-01-30 NOTE — L&D DELIVERY NOTE
"Vaginal Delivery Procedure Note     Delivery provider: Elaine Dial MD; [facility]     Delivery date/time: 2023     Preop Dx:   1. IUP at 39w1d    Postop Dx:   Same as above      Anesthesia: none, lidocaine with epinephrine for repair of laceration     QBL: 450 mL     Specimens: cord blood      Findings: VMI \"Miky\" , OA presentation, 3VC, no nuchal cord, Apgars 8/9,  weight 3500 g/7lb 11oz. 2nd degree perineal laceration. cytotec MS given    Labor Course: Lisa Escalante is a 32 year old  at 39w1d for eIOL. Her pregnancy has been uncomplicated. She declined genetics. She had a normal 20 week US. They are expecting their first BOY!      GBS positive.     She was started on pitocin per protocol and antibiotics for GBS ppx. At 1230 AROM completed. She reached completed and started maternal expulsive efforts.      Fetal tracing 1 throughout labor and category 1 during pushing efforts to delivery     Delivery details:  At bedside. Patient complete and pushing. With excellent maternal expulsive efforts, the infant's head delivered with 1 push. Examined for nuchal cord and none noted. Shoulders followed without force or delay. Delivered VMI to mother's abdomen. Cord clamped and cut after 60 seconds; 3VC. Cord blood collected and sent.  Placenta delivered via gentle traction and fundal massage. Perineum examined and 2nd degree laceration noted and repaired in standard fashion. She was given fentanyl and lidocaine was injected prior to repair. Clots continued to be expressed with fundal massage. Bimanual exam removed small amount of membranes and bleeding improved. Cyctotec 800 mcg placed rectally. Remainder of exam showed a first degree perineal laceration left of the 2nd degree which was reapproximated with exofin skin glue.  cc. Apgars 8/9 at 1 and 5 minutes respectively. Vaginal counts correct. Fundus firm at -2 with scant/normal flow after.    Sponge and needle count were " correct.      stable with mother admitted to NBN. Mother stable in delivery room.     Alley Dial MD  [phone]  23 4:17 PM

## 2023-01-30 NOTE — H&P
OB Admission History and Physical    HPI:  Patient is a 32 year old female who presents to Labor and Delivery at 39w1d for eIOL. Her pregnancy has been uncomplicated. She declined genetics. She had a normal 20 week US. They are expecting their first BOY!     GBS positive.     Patient Active Problem List   Diagnosis     Allergic rhinitis due to other allergen     Generalized hyperhidrosis      (normal spontaneous vaginal delivery)     Labor and delivery, indication for care     Indication for care in labor or delivery       Full Code      Prenatal Lab Results:      Latest Reference Range & Units 22 14:42   Hepatitis B Surface Antigen (External) Negative  Negative (E)   HIV 1&2 Antibody (External) Non Reactive  Non Reactive (E)   Treponema [place]um Antibody (RPR) (External) Non Reactive  Non Reactive (E)   Rubella Antibody IgG (External) Immune>0.99 Index 4.83 (E)   (E): External lab result        OB History    Para Term  AB Living   4 3 3 0 0 3   SAB IAB Ectopic Multiple Live Births   0 0 0 0 3      # Outcome Date GA Lbr Sabino/2nd Weight Sex Delivery Anes PTL Lv   4 Current            3 Term 21 40w0d 03:10 / 00:05 3.41 kg (7 lb 8.3 oz) F Vag-Spont Local N CHULA      Name: RUTKELVINHANDY,FEMALE-ENRIQUE      Apgar1: 8  Apgar5: 9   2 Term 19 40w6d 03:15 / 00:12 3.6 kg (7 lb 15 oz) F Vag-Spont Local N CHULA      Name: RUT MATHEWNOREEN,FEMALE-ENRIQUE      Apgar1: 8  Apgar5: 9   1 Term 16   3.771 kg (8 lb 5 oz) F Vag-Spont None N CHULA       Past Surgical History:   Procedure Laterality Date     HC TOOTH EXTRACTION W/FORCEP         Past Medical History:   Diagnosis Date     Allergic rhinitis due to other allergen     has been tested, cats, dogs, seasonal     Generalized hyperhidrosis      Other acne      Other atopic dermatitis and related conditions        Social History     Socioeconomic History     Marital status:      Spouse name: None     Number of children: None     Years of  education: None     Highest education level: None   Tobacco Use     Smoking status: Never     Smokeless tobacco: Never   Substance and Sexual Activity     Alcohol use: No     Drug use: No     Sexual activity: Yes     Partners: Male       Medications:    Medications Prior to Admission   Medication Sig Dispense Refill Last Dose     cetirizine (ZYRTEC) 10 MG tablet Take 10 mg by mouth daily        lactobacillus rhamnosus, GG, (CULTURELL) capsule Take 1 capsule by mouth 2 times daily        omeprazole (PRILOSEC) 20 MG DR capsule Take 20 mg by mouth daily        Prenatal Vit-Fe Fumarate-FA (PRENATAL MULTIVITAMIN PLUS IRON) 27-0.8 MG TABS per tablet Take 1 tablet by mouth daily          Allergies:    Patient has no known allergies.    Review of Systems:  A comprehensive review of systems was negative except for those noted in HPI    Physical Examination:  Current Inpatient Vital Signs: Blood pressure 126/70, temperature 98.2  F (36.8  C), resp. rate 16, unknown if currently breastfeeding.    General: NAD, A/Ox3  Lungs:  Normal effort  Heart:  Regular effort  Abdomen:  gravid.   Estimated Fetal Weight: 3450g   Extremities:  Normal, no edema  Skin:  normal    Cervical Exam:  3-4/80/-2    AROM With large amount of clear fluid    BPM: 140  Variability:  moderate.  Accelerations:  present.  Decelerations:  present.  Contractions:  present, frequency every 4 minutes.  Overall assessment:  Category 1      Assessment/Plan:  , intrauterine pregnancy at 39w1d with KIMBERLY of 2023  1. Admit to Labor and Delivery for eIOL.   - start pitocin per protocol   - AROM when GBS antibiotics x2 completed   - anticipate    2. GBS positive.  Prophylaxis: yes  3. CBC/clot tube  4. Rubella immune  5. Blood type O pos; Rhogam not indicated      Alley Dial MD  [phone]  23 1:56 PM

## 2023-01-31 VITALS
HEART RATE: 64 BPM | RESPIRATION RATE: 16 BRPM | DIASTOLIC BLOOD PRESSURE: 68 MMHG | SYSTOLIC BLOOD PRESSURE: 116 MMHG | TEMPERATURE: 98.2 F

## 2023-01-31 PROCEDURE — 250N000013 HC RX MED GY IP 250 OP 250 PS 637: Performed by: OBSTETRICS & GYNECOLOGY

## 2023-01-31 PROCEDURE — 250N000013 HC RX MED GY IP 250 OP 250 PS 637: Performed by: STUDENT IN AN ORGANIZED HEALTH CARE EDUCATION/TRAINING PROGRAM

## 2023-01-31 RX ORDER — OXYCODONE HYDROCHLORIDE 5 MG/1
5 TABLET ORAL EVERY 4 HOURS PRN
Status: COMPLETED | OUTPATIENT
Start: 2023-01-31 | End: 2023-01-31

## 2023-01-31 RX ADMIN — OXYCODONE HYDROCHLORIDE 5 MG: 5 TABLET ORAL at 18:04

## 2023-01-31 RX ADMIN — OXYCODONE HYDROCHLORIDE 5 MG: 5 TABLET ORAL at 10:46

## 2023-01-31 RX ADMIN — ACETAMINOPHEN 650 MG: 325 TABLET, FILM COATED ORAL at 18:04

## 2023-01-31 RX ADMIN — IBUPROFEN 800 MG: 400 TABLET ORAL at 18:04

## 2023-01-31 RX ADMIN — IBUPROFEN 800 MG: 400 TABLET ORAL at 05:26

## 2023-01-31 RX ADMIN — IBUPROFEN 800 MG: 400 TABLET ORAL at 11:50

## 2023-01-31 RX ADMIN — ACETAMINOPHEN 650 MG: 325 TABLET, FILM COATED ORAL at 05:26

## 2023-01-31 RX ADMIN — OXYCODONE HYDROCHLORIDE 5 MG: 5 TABLET ORAL at 03:29

## 2023-01-31 RX ADMIN — DOCUSATE SODIUM 100 MG: 100 CAPSULE, LIQUID FILLED ORAL at 08:03

## 2023-01-31 RX ADMIN — ACETAMINOPHEN 650 MG: 325 TABLET, FILM COATED ORAL at 11:50

## 2023-01-31 ASSESSMENT — ACTIVITIES OF DAILY LIVING (ADL)
ADLS_ACUITY_SCORE: 18

## 2023-01-31 NOTE — LACTATION NOTE
"Lactation visit with Lisa, FOSHILPI, and baby boy Miky.    This is Lisa's fourth baby, and she states baby Miky has been her best breast-feeder so far. Lisa shares Miky breast-fed well after delivery, but has been a little more sleepy. Reviewed \"normal sleepiness\"  Day 1 tends to be followed by a cluster feeding pattern on second day/night.       Parents educated on  breastfeeding basics:   1) Watch for early feeding cues (licking lips, stirring or rooting, sucking movement with mouth, hands to mouth).  2) Infant should breastfeed on demand and a minimum of 8 times in 24 hours. Offer to  breastfeed infant at least every 3 hours.     Lisa has her own nipple cream and Kait breast pads. She has early tenderness with latching but the discomfort does dissipate she shares after infant has been suckling for a bit.    Discussed physiology of milk production from colostrum through milk \"coming in\".  Discussed normal infant weight loss and when infant should be back to birth weight. Stressed the importance of continuing to track infant's feeds and void/stools patterns, at least until infant has returned to his birth weight.    Feeding plan recommendations: provide unlimited, on-demand breast feedings: At least 8-12 times/24 hours (reviewed early feeding cues). Suggested pumping if baby has a poor feeding or if supplementation is necessary. Encouraged on-going use of a feeding log or reji to record feedings along with void/stool patterns. Avoid pacifiers (until 1 month of age per AAP guidelines) and supplementation with formula unless medically indicated. Follow up with [facility] as requested and encouraged lactation follow up. Reviewed [facility] outpatient lactation resources. Appreciative of visit.    Susannah Thomas RN, IBCLC            "

## 2023-01-31 NOTE — PLAN OF CARE
Oriented to postpartum. Vital signs stable. Postpartum assessment WDL. Pain controlled with tylenol and ibuprofen. Patient voiding without difficulty. Breastfeeding on cue with min assist. Patient and infant bonding well. Will continue with current plan of care.

## 2023-01-31 NOTE — PLAN OF CARE
VSS on RA. Denies N/V, pre-e symptoms. Up independent, voiding well. Taking tylenol and ibuprofen for pain, c/o strong cramping pain, PRN oxy given x1 with good effect. Breastfeeding on cue without assistance. Bonding well with baby.  Humphrey at bedside. Will continue with POC.

## 2023-01-31 NOTE — PLAN OF CARE
Data: Lisa Escalante transferred to [facility] via wheelchair at 1755. Baby transferred via parent's arms.  Action: Receiving unit notified of transfer: Yes. Patient and family notified of room change. Report given to DELMI Dueñas RN at 1800. Belongings sent to receiving unit. Accompanied by Registered Nurse. Oriented patient to surroundings. Call light within reach. ID bands double-checked with receiving RN.  Response: Patient tolerated transfer and is stable.

## 2023-01-31 NOTE — PLAN OF CARE
Patient denies adhesive allergy. Specifically discussed no previous reaction to band-aids, other adhesives, or other medical patches.  Caroline monitoring in use.  Patient educated that redness may occur following removal of the patches and will slowly fade.  Patient instructed to notify nurse if any adverse reaction noted.

## 2023-01-31 NOTE — PROGRESS NOTES
Post Partum Progress Note - Vaginal Delivery    Subjective:   The patient feels well, no issues overnight. She has had some more significant uterine cramping requiring oxycodone use.  Ambulating:  yes  Voiding: yes  Passing flatus: yes  Tolerating regular diet: yes  Pain well controlled: yes.    Subjective pain ratin out of 10  Lochia: normal    Infant status: well   Feeding via breast:      Objective:     Vitals:    23 1732 23 2008 23 0029 23 0400   BP: 116/60 122/76 129/75 112/68   BP Location:  Right arm Right arm Right arm   Patient Position:  Semi-Goodrich's  Semi-Goodrich's   Cuff Size:  Adult Regular  Adult Regular   Pulse:  66 54 65   Resp:  16 16 16   Temp:  98.1  F (36.7  C) 98.4  F (36.9  C) 98.3  F (36.8  C)   TempSrc:  Oral Oral Oral       Physical Exam:  General: No acute distress, alert and oriented X 3.   Heart: RRR, No murmurs, rubs or gallop.   Lungs: Clear breath sounds bilaterally.  Adequate effort.   Abdomen:  Soft, firm fundus at umbilicus. Appropriately tender.     Incision N/A   Breast No erythema.  No evidence of infection   Extremities no calf tenderness; edema: none   Perineum N/A   Lochia  None/Light       Assessment & Plan   Lisa Escalante is a 32 year old  s/p   at 39w2d on 23.     1.  Post Partum Day 1  - meeting all postpartum goals  - plan for discharge to home tomorrow vs this evening pending baby's discharge (GBS+, adequately treated),   2.  Activity:  Encouraged pelvic rest x6 weeks.  3.  Continue post partum care    Dispo: pending baby's discharge, likely discharge tomorrow morning, although okay for maternal discharge if baby discharged this evening.    Mya Dee MD  OBGYN, [facility]  2023 8:12 AM

## 2023-05-19 ENCOUNTER — TRANSFERRED RECORDS (OUTPATIENT)
Dept: HEALTH INFORMATION MANAGEMENT | Facility: CLINIC | Age: 33
End: 2023-05-19
Payer: COMMERCIAL

## 2023-05-19 LAB
ALT SERPL-CCNC: 18 U/L (ref 6–29)
AST SERPL-CCNC: 18 U/L (ref 10–30)
CREATININE (EXTERNAL): 0.85 MG/DL (ref 0.5–0.97)
GFR ESTIMATED (EXTERNAL): 92 ML/MIN/1.73M2
GLUCOSE (EXTERNAL): 80 MG/DL (ref 65–99)
POTASSIUM (EXTERNAL): 4 MMOL/L (ref 3.5–5.3)
TSH SERPL-ACNC: 1.33 MIU/L

## 2023-05-22 ENCOUNTER — TRANSFERRED RECORDS (OUTPATIENT)
Dept: HEALTH INFORMATION MANAGEMENT | Facility: CLINIC | Age: 33
End: 2023-05-22
Payer: COMMERCIAL

## 2023-06-05 ENCOUNTER — MEDICAL CORRESPONDENCE (OUTPATIENT)
Dept: HEALTH INFORMATION MANAGEMENT | Facility: CLINIC | Age: 33
End: 2023-06-05
Payer: COMMERCIAL

## 2023-06-05 DIAGNOSIS — R00.1 BRADYCARDIA: ICD-10-CM

## 2023-06-05 DIAGNOSIS — R42 DIZZINESS: ICD-10-CM

## 2023-06-05 DIAGNOSIS — I44.0 1ST DEGREE AV BLOCK: Primary | ICD-10-CM

## 2023-06-30 ENCOUNTER — OFFICE VISIT (OUTPATIENT)
Dept: CARDIOLOGY | Facility: CLINIC | Age: 33
End: 2023-06-30
Payer: COMMERCIAL

## 2023-06-30 VITALS
DIASTOLIC BLOOD PRESSURE: 72 MMHG | SYSTOLIC BLOOD PRESSURE: 108 MMHG | OXYGEN SATURATION: 99 % | HEIGHT: 67 IN | WEIGHT: 153.7 LBS | HEART RATE: 75 BPM | BODY MASS INDEX: 24.12 KG/M2

## 2023-06-30 DIAGNOSIS — Z82.49 FAMILY HISTORY OF CORONARY ARTERY DISEASE: ICD-10-CM

## 2023-06-30 DIAGNOSIS — I95.1 ORTHOSTASIS: ICD-10-CM

## 2023-06-30 DIAGNOSIS — R00.1 SINUS BRADYCARDIA: ICD-10-CM

## 2023-06-30 DIAGNOSIS — I44.0 1ST DEGREE AV BLOCK: ICD-10-CM

## 2023-06-30 DIAGNOSIS — R06.09 DOE (DYSPNEA ON EXERTION): Primary | ICD-10-CM

## 2023-06-30 PROCEDURE — 99204 OFFICE O/P NEW MOD 45 MIN: CPT | Performed by: INTERNAL MEDICINE

## 2023-06-30 NOTE — LETTER
6/30/2023    [facility]  [address]  [place] [address]    RE: Lisa Simeon Bob Hanna       Dear Colleague,     I had the pleasure of seeing Lisa Simeon Bob Hanna in the [facility].  HPI and Plan:   Lisa is a very nice 32-year-old woman who is referred because of orthostasis, first-degree AV block and bradycardia.  I take care of her father who I met 11 years ago with an acute infarct.  Her mother also has some neck discomfort and abnormal stress test, and abnormal CT angiogram and is headed to the Cath Lab.    Lisa is a mother of 4  kids 7 and under with a 5-month-old.  She has problems with fatigue tiredness, dyspnea on exertion.  She has no chest arm neck jaw or shoulder discomfort.  No orthopnea or PND.  She states she only gets up once a night.  Recently she has been wearing her Apple Watch to bed and is given her notifications that her heart rate is below 60.    Work-up through [facility] shows normal electrolytes, thyroid function tests, CBC and EKG data sinus rhythm with first-degree AV block.  A Holter monitor shows average heart rate of 61 ranging from 36-1 32.  Rare PACs and PVCs.  Her episodes of bradycardia on her watch are at 4 in the morning.    She also describes orthostasis which she states she has had her whole life.  She states it is worse during pregnancies.  But she is learned to live around it.  As a matter fact she states is better now than it has been in the past    Assessment and plan.  I am not concerned about her bradycardia.  The fact that she slows down at nighttime is not an issue.  I suspect she has appropriate response to exercise given the fact that her heart rate does speed up to 132.    She does have a family history of coronary disease on both sides of her family.  She does not know her fasting lipid profile.  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  Nonetheless I am concerned about  underlying coronary artery disease.  And would consider a calcium score.    Given her dyspnea on exertion I will rule out the possibility of postpartum cardiomyopathy and to evaluate for any chronotropic incompetence I will order a stress echocardiogram.  This will allow us to see if she has any underlying structural heart disease.  And to make sure she has appropriate heart rate and blood pressure response to exercise.  If this is okay then I would consider a calcium score to see how aggressive we need to be about looking at other risk factors.    Thank you for allow me to participate in this patient's care.  Sincerely,                               Waldo Han MD [facility]          Today's clinic visit entailed:  Review of the result(s) of each unique test - EKG, lab work, Holter monitor  The following tests were independently interpreted by me as noted in my documentation: EKG  Ordering of each unique test  Prescription drug management  45 minutes spent by me on the date of the encounter doing chart review, history and exam, documentation and further activities per the note  Provider  Link to [facility] Help Grid           Orders Placed This Encounter   Procedures    Exercise Stress Echocardiogram       No orders of the defined types were placed in this encounter.      There are no discontinued medications.      Encounter Diagnoses   Name Primary?    BALLARD (dyspnea on exertion) Yes    1st degree AV block     Sinus bradycardia     Orthostasis     Family history of coronary artery disease        CURRENT MEDICATIONS:  Current Outpatient Medications   Medication Sig Dispense Refill    cetirizine (ZYRTEC) 10 MG tablet Take 10 mg by mouth daily      lactobacillus rhamnosus, GG, (CULTURELL) capsule Take 1 capsule by mouth 2 times daily      omeprazole (PRILOSEC) 20 MG DR capsule Take 20 mg by mouth daily      Prenatal Vit-Fe Fumarate-FA (PRENATAL MULTIVITAMIN PLUS IRON) 27-0.8 MG TABS per tablet Take 1 tablet by mouth daily  "(Patient not taking: Reported on 6/30/2023)         ALLERGIES   No Known Allergies    PAST MEDICAL HISTORY:  Past Medical History:   Diagnosis Date    Allergic rhinitis due to other allergen     has been tested, cats, dogs, seasonal    Generalized hyperhidrosis     Other acne     Other atopic dermatitis and related conditions        PAST SURGICAL HISTORY:  Past Surgical History:   Procedure Laterality Date    HC TOOTH EXTRACTION W/FORCEP         FAMILY HISTORY:  Family History   Problem Relation Age of Onset    Lipids Father     Allergies Father     Allergies Mother     C.A.D. Paternal Uncle         52years    Lipids Maternal Grandmother     Thyroid Disease Maternal Grandmother     Lipids Maternal Uncle     Allergies Brother     Respiratory Brother         asthma       SOCIAL HISTORY:  Social History     Socioeconomic History    Marital status:      Spouse name: None    Number of children: None    Years of education: None    Highest education level: None   Tobacco Use    Smoking status: Never    Smokeless tobacco: Never   Substance and Sexual Activity    Alcohol use: Yes     Comment: occ    Drug use: No    Sexual activity: Yes     Partners: Male       Review of Systems:  Skin:  not assessed       Eyes:  not assessed      ENT:  not assessed      Respiratory:  Positive for shortness of breath;dyspnea on exertion gets winded easily   Cardiovascular:    lightheadedness;Positive for;fatigue;dizziness;palpitations;syncope or near-syncope pulse lowest has been 36,energy level very low,palpitations pt states they feel \"nervous\",near syncope in the past  Gastroenterology: not assessed      Genitourinary:  not assessed      Musculoskeletal:  not assessed      Neurologic:  not assessed      Psychiatric:  not assessed      Heme/Lymph/Imm:  not assessed      Endocrine:  not assessed        Physical Exam:  Vitals: /72 (BP Location: Right arm, Patient Position: Sitting, Cuff Size: Adult Regular)   Pulse 75   Ht " "1.702 m (5' 7\")   Wt 69.7 kg (153 lb 11.2 oz)   SpO2 99%   BMI 24.07 kg/m      Constitutional:  cooperative, alert and oriented, well developed, well nourished, in no acute distress        Skin:  warm and dry to the touch, no apparent skin lesions or masses noted          Head:  normocephalic, no masses or lesions        Eyes:  pupils equal and round, conjunctivae and lids unremarkable, sclera white, no xanthalasma, EOMS intact, no nystagmus        Lymph:      ENT:  no pallor or cyanosis, dentition good        Neck:  no carotid bruit        Respiratory:  normal breath sounds, clear to auscultation, normal A-P diameter, normal symmetry, normal respiratory excursion, no use of accessory muscles         Cardiac: regular rhythm;no murmurs, gallops or rubs detected occasional premature beats              pulses full and equal                                        GI:           Extremities and Muscular Skeletal:  no edema;no spinal abnormalities noted;normal muscle strength and tone              Neurological:  no gross motor deficits        Psych:  affect appropriate, oriented to time, person and place        CC  No referring provider defined for this encounter.      Thank you for allowing me to participate in the care of your patient.      Sincerely,     Waldo Han MD     [facility]  "

## 2023-06-30 NOTE — PROGRESS NOTES
HPI and Plan:   Lisa is a very nice 32-year-old woman who is referred because of orthostasis, first-degree AV block and bradycardia.  I take care of her father who I met 11 years ago with an acute infarct.  Her mother also has some neck discomfort and abnormal stress test, and abnormal CT angiogram and is headed to the Cath Lab.    Lisa is a mother of 4  kids 7 and under with a 5-month-old.  She has problems with fatigue tiredness, dyspnea on exertion.  She has no chest arm neck jaw or shoulder discomfort.  No orthopnea or PND.  She states she only gets up once a night.  Recently she has been wearing her Apple Watch to bed and is given her notifications that her heart rate is below 60.    Work-up through [facility] [place] shows normal electrolytes, thyroid function tests, CBC and EKG data sinus rhythm with first-degree AV block.  A Holter monitor shows average heart rate of 61 ranging from 36-1 32.  Rare PACs and PVCs.  Her episodes of bradycardia on her watch are at 4 in the morning.    She also describes orthostasis which she states she has had her whole life.  She states it is worse during pregnancies.  But she is learned to live around it.  As a matter fact she states is better now than it has been in the past    Assessment and plan.  I am not concerned about her bradycardia.  The fact that she slows down at nighttime is not an issue.  I suspect she has appropriate response to exercise given the fact that her heart rate does speed up to 132.    She does have a family history of coronary disease on both sides of her family.  She does not know her fasting lipid profile.  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  Nonetheless I am concerned about underlying coronary artery disease.  And would consider a calcium score.    Given her dyspnea on exertion I will rule out the possibility of postpartum cardiomyopathy and to evaluate for any chronotropic incompetence I will order a stress  echocardiogram.  This will allow us to see if she has any underlying structural heart disease.  And to make sure she has appropriate heart rate and blood pressure response to exercise.  If this is okay then I would consider a calcium score to see how aggressive we need to be about looking at other risk factors.    Thank you for allow me to participate in this patient's care.  Sincerely,                               Waldo Han MD [facility]          Today's clinic visit entailed:  Review of the result(s) of each unique test - EKG, lab work, Holter monitor  The following tests were independently interpreted by me as noted in my documentation: EKG  Ordering of each unique test  Prescription drug management  45 minutes spent by me on the date of the encounter doing chart review, history and exam, documentation and further activities per the note  Provider  Link to [facility] Help Grid           Orders Placed This Encounter   Procedures     Exercise Stress Echocardiogram       No orders of the defined types were placed in this encounter.      There are no discontinued medications.      Encounter Diagnoses   Name Primary?     BALLARD (dyspnea on exertion) Yes     1st degree AV block      Sinus bradycardia      Orthostasis      Family history of coronary artery disease        CURRENT MEDICATIONS:  Current Outpatient Medications   Medication Sig Dispense Refill     cetirizine (ZYRTEC) 10 MG tablet Take 10 mg by mouth daily       lactobacillus rhamnosus, GG, (CULTURELL) capsule Take 1 capsule by mouth 2 times daily       omeprazole (PRILOSEC) 20 MG DR capsule Take 20 mg by mouth daily       Prenatal Vit-Fe Fumarate-FA (PRENATAL MULTIVITAMIN PLUS IRON) 27-0.8 MG TABS per tablet Take 1 tablet by mouth daily (Patient not taking: Reported on 6/30/2023)         ALLERGIES   No Known Allergies    PAST MEDICAL HISTORY:  Past Medical History:   Diagnosis Date     Allergic rhinitis due to other allergen     has been tested, cats, dogs,  "seasonal     Generalized hyperhidrosis      Other acne      Other atopic dermatitis and related conditions        PAST SURGICAL HISTORY:  Past Surgical History:   Procedure Laterality Date     HC TOOTH EXTRACTION W/FORCEP         FAMILY HISTORY:  Family History   Problem Relation Age of Onset     Lipids Father      Allergies Father      Allergies Mother      C.A.D. Paternal Uncle         52years     Lipids Maternal Grandmother      Thyroid Disease Maternal Grandmother      Lipids Maternal Uncle      Allergies Brother      Respiratory Brother         asthma       SOCIAL HISTORY:  Social History     Socioeconomic History     Marital status:      Spouse name: None     Number of children: None     Years of education: None     Highest education level: None   Tobacco Use     Smoking status: Never     Smokeless tobacco: Never   Substance and Sexual Activity     Alcohol use: Yes     Comment: occ     Drug use: No     Sexual activity: Yes     Partners: Male       Review of Systems:  Skin:  not assessed       Eyes:  not assessed      ENT:  not assessed      Respiratory:  Positive for shortness of breath;dyspnea on exertion gets winded easily   Cardiovascular:    lightheadedness;Positive for;fatigue;dizziness;palpitations;syncope or near-syncope pulse lowest has been 36,energy level very low,palpitations pt states they feel \"nervous\",near syncope in the past  Gastroenterology: not assessed      Genitourinary:  not assessed      Musculoskeletal:  not assessed      Neurologic:  not assessed      Psychiatric:  not assessed      Heme/Lymph/Imm:  not assessed      Endocrine:  not assessed        Physical Exam:  Vitals: /72 (BP Location: Right arm, Patient Position: Sitting, Cuff Size: Adult Regular)   Pulse 75   Ht 1.702 m (5' 7\")   Wt 69.7 kg (153 lb 11.2 oz)   SpO2 99%   BMI 24.07 kg/m      Constitutional:  cooperative, alert and oriented, well developed, well nourished, in no acute distress        Skin:  warm " and dry to the touch, no apparent skin lesions or masses noted          Head:  normocephalic, no masses or lesions        Eyes:  pupils equal and round, conjunctivae and lids unremarkable, sclera white, no xanthalasma, EOMS intact, no nystagmus        Lymph:      ENT:  no pallor or cyanosis, dentition good        Neck:  no carotid bruit        Respiratory:  normal breath sounds, clear to auscultation, normal A-P diameter, normal symmetry, normal respiratory excursion, no use of accessory muscles         Cardiac: regular rhythm;no murmurs, gallops or rubs detected occasional premature beats              pulses full and equal                                        GI:           Extremities and Muscular Skeletal:  no edema;no spinal abnormalities noted;normal muscle strength and tone              Neurological:  no gross motor deficits        Psych:  affect appropriate, oriented to time, person and place        CC  No referring provider defined for this encounter.

## 2023-07-02 ENCOUNTER — HEALTH MAINTENANCE LETTER (OUTPATIENT)
Age: 33
End: 2023-07-02

## 2023-07-19 ENCOUNTER — TELEPHONE (OUTPATIENT)
Dept: CARDIOLOGY | Facility: CLINIC | Age: 33
End: 2023-07-19
Payer: COMMERCIAL

## 2023-07-19 DIAGNOSIS — R00.1 SINUS BRADYCARDIA: ICD-10-CM

## 2023-07-19 DIAGNOSIS — I44.0 1ST DEGREE AV BLOCK: ICD-10-CM

## 2023-07-19 DIAGNOSIS — Z82.49 FAMILY HISTORY OF CORONARY ARTERY DISEASE: ICD-10-CM

## 2023-07-19 DIAGNOSIS — R06.09 DOE (DYSPNEA ON EXERTION): Primary | ICD-10-CM

## 2023-07-19 NOTE — TELEPHONE ENCOUNTER
"Dr Han messaged regarding stress echo denial, see below. Per chart review patient already cancelled test scheduled for . Test was ordered at visit  due to shortness of breath to rule out postpartum cardiomyopathy and chronotropic incompetence.      ----- Message -----  From: Tyler Hawkins  Sent: 2023   2:53 PM CDT  To: Waldo Han MD; #  Subject: [place] Denial Notification                   [place] Denial Notification    Patient Name:  Lisa Redmond  :    1990  MRN:    [id number]    Dr. Han,    The authorization for procedure Echo Stress Echocardiogram on date of service 2023 has been denied by the patient's insurance for the following reason:    Denial Reason:   \"Based on [employer] Cardiac Imaging Guidelines Section(s): Stress Testing with Imaging -  Indications (CD 1.4), we cannot approve this request. Your healthcare provider told us that there is a concern related to the blood vessels in your heart. The request cannot be approved because:  You must have one of the following.  -New, recurrent, or worsening signs or symptoms suggestive of decreased oxygen to your heart muscle (cardiac ischemia) along with a finding on a tracing of your heart's electrical activity (electrocardiogram or ECG) that could make it hard to tell if your heart is getting kristen ugh blood supply with exercise.  -A finding on an ECG that could make it hard to tell if your heart is getting enough blood supply with exercise that is either new or has never been evaluated.  -Other indication listed in this guideline.\"    Below is the information we provided to the patient's insurance company:    Order:                          Yes  Visit Notes:                  HP 3/16/23, OV 23  Results:                       EKG 23, Holter, 23  Other:                          N/A    A ixfh-gu-vkyj review can be done by calling the insurance/third party authorization vendor with the " following information:    Insurance: [employer] [place]  Auth Vendor:  [employer]  Phone #: [phone]  Due Date: 07/20/2023    Patient ID: [id number]  Case/Ref #: [id number]    Please let us know how you wish to proceed as soon as possible. I have contacted the patient and informed her. She is aware and stated that she is going to cancel the appointment and would like a call from the care team to see what is the next step.    Thank you,    Tyler Aceves Prior Authorization

## 2023-07-19 NOTE — TELEPHONE ENCOUNTER
[facility] Call Center    Phone Message    May a detailed message be left on voicemail: yes     Reason for Call: Other: Please reach out to pt to cancel stress test appt.     Action Taken: Other: Cardiology    Travel Screening: Not Applicable     Thank you!  [facility]

## 2023-07-20 NOTE — TELEPHONE ENCOUNTER
Waldo Han MD Hiljus, Audrey G, RN  Caller: Unspecified (Yesterday,  4:40 PM)    Please order them separately.  Order an echocardiogram to evaluate for postpartum cardiomyopathy and dyspnea.  Order a gxt to evaluate for chronotropic incompetence for dyspnea on exertion.       Called patient to review recommendation from Dr Han and she was agreeable to plan. Stress echo order removed and new orders placed for echo and GXT. Scheduling messaged to arrange.

## 2023-07-26 ENCOUNTER — TELEPHONE (OUTPATIENT)
Dept: CARDIOLOGY | Facility: CLINIC | Age: 33
End: 2023-07-26
Payer: COMMERCIAL

## 2023-07-26 NOTE — TELEPHONE ENCOUNTER
"----- Message from Parvin Lazaro CMA sent at 2023  2:55 PM CDT -----  Regarding: FW: [employer] Denial Notification    ----- Message -----  From: Tyler Hawkins  Sent: 2023   2:53 PM CDT  To: Waldo Han MD; #  Subject: [place] Denial Notification                   [place] Denial Notification    Patient Name:  Lisa Redmond  :    1990  MRN:    [id number]    Dr. Han,    The authorization for procedure Echo Stress Echocardiogram on date of service 2023 has been denied by the patient's insurance for the following reason:    Denial Reason:   \"Based on [employer] Cardiac Imaging Guidelines Section(s): Stress Testing with Imaging -  Indications (CD 1.4), we cannot approve this request. Your healthcare provider told us that there is a concern related to the blood vessels in your heart. The request cannot be approved because:  You must have one of the following.  -New, recurrent, or worsening signs or symptoms suggestive of decreased oxygen to your heart muscle (cardiac ischemia) along with a finding on a tracing of your heart's electrical activity (electrocardiogram or ECG) that could make it hard to tell if your heart is getting kristen ugh blood supply with exercise.  -A finding on an ECG that could make it hard to tell if your heart is getting enough blood supply with exercise that is either new or has never been evaluated.  -Other indication listed in this guideline.\"    Below is the information we provided to the patient's insurance company:    Order:                          Yes  Visit Notes:                  HP 3/16/23, OV 23  Results:                       EKG 23, Holter, 23  Other:                          N/A    A [id number] review can be done by calling the insurance/third party authorization vendor with the following information:    Insurance: [employer] [facility]  Auth Vendor:  [employer]  Phone #: [phone]  Due Date: 2023    Patient " ID: [id number]  Case/Ref #: [id number]    Please let us know how you wish to proceed as soon as possible. I have contacted the patient and informed her. She is aware and stated that she is going to cancel the appointment and would like a call from the care team to see what is the next step.    Thank you,    Tyler Aceves Prior Authorization

## 2023-07-27 ENCOUNTER — HOSPITAL ENCOUNTER (OUTPATIENT)
Dept: CARDIOLOGY | Facility: CLINIC | Age: 33
Discharge: HOME | End: 2023-07-27
Attending: INTERNAL MEDICINE
Payer: COMMERCIAL

## 2023-07-27 ENCOUNTER — TELEPHONE (OUTPATIENT)
Dept: CARDIOLOGY | Facility: CLINIC | Age: 33
End: 2023-07-27

## 2023-07-27 DIAGNOSIS — R00.1 SINUS BRADYCARDIA: ICD-10-CM

## 2023-07-27 DIAGNOSIS — Z82.49 FAMILY HISTORY OF CORONARY ARTERY DISEASE: ICD-10-CM

## 2023-07-27 DIAGNOSIS — R06.09 DOE (DYSPNEA ON EXERTION): ICD-10-CM

## 2023-07-27 DIAGNOSIS — I44.0 1ST DEGREE AV BLOCK: ICD-10-CM

## 2023-07-27 DIAGNOSIS — Z13.6 SCREENING FOR CARDIOVASCULAR CONDITION: ICD-10-CM

## 2023-07-27 DIAGNOSIS — Z13.220 SCREENING CHOLESTEROL LEVEL: ICD-10-CM

## 2023-07-27 DIAGNOSIS — Z82.49 FAMILY HISTORY OF CORONARY ARTERY DISEASE: Primary | ICD-10-CM

## 2023-07-27 LAB — LVEF ECHO: NORMAL

## 2023-07-27 PROCEDURE — 93018 CV STRESS TEST I&R ONLY: CPT | Performed by: INTERNAL MEDICINE

## 2023-07-27 PROCEDURE — 93016 CV STRESS TEST SUPVJ ONLY: CPT | Performed by: INTERNAL MEDICINE

## 2023-07-27 PROCEDURE — 93306 TTE W/DOPPLER COMPLETE: CPT

## 2023-07-27 PROCEDURE — 93306 TTE W/DOPPLER COMPLETE: CPT | Mod: 26 | Performed by: INTERNAL MEDICINE

## 2023-07-27 PROCEDURE — 93017 CV STRESS TEST TRACING ONLY: CPT

## 2023-07-27 NOTE — TELEPHONE ENCOUNTER
Spoke with patient. 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 She will call back when she is back to her baseline and discuss checking a CT calcium score and lipids.

## 2023-07-27 NOTE — TELEPHONE ENCOUNTER
Testing completed as below, ordered by Dr Han due to dyspnea on exertion, family history of CAD. Note from 6/30 mentions possibly getting a calcium score. Routed to provider to review.     Echo  The visual ejection fraction is 50-55%.  Left ventricular systolic function is low normal.  Diastolic Doppler findings (E/E' ratio and/or other parameters) suggest left  ventricular filling pressures are normal.  The inferior vena cava was normal in size with preserved respiratory  variability.  [place]: Dr Sidhu     Stress ECG  The Duke treadmill score was low risk ( >5 Duke score).  Normal stress EKG test.  [place]: Dr Singh

## 2023-07-27 NOTE — TELEPHONE ENCOUNTER
----- Message from Mary Michele sent at 7/27/2023  9:15 AM CDT -----  Regarding: Order for Lipid profile  Patient is having Echo for Dr. Han now and is fasting.  He said he mentioned getting one at the last appointment but there is no order for it.   She would like to get it now if at all possible.   Her Echo is 9:30.      She will wait here for a decision.

## 2023-07-27 NOTE — TELEPHONE ENCOUNTER
Message left on Cell Phone to call Dr. Han's Team regarding FLP draw today? Patient wanted to schedule today.   Per Dr. Han last OV note : She does have a family history of coronary disease on both sides of her family.  She does not know her fasting lipid profile.  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  Nonetheless I am concerned about underlying coronary artery disease.  And would consider a calcium score    Mary Michele [facility] Heart Team 2  Patient is having Echo for Dr. Han now and is fasting.  He said he mentioned getting one at the last appointment but there is no order for it.   She would like to get it now if at all possible.   Her Echo is 9:30.      She will wait here for a decision.      Last Dr. Han OV 6-30-23 -     She does have a family history of coronary disease on both sides of her family.  She does not know her fasting lipid profile.  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  Nonetheless I am concerned about underlying coronary artery disease.  And would consider a calcium score.     Given her dyspnea on exertion I will rule out the possibility of postpartum cardiomyopathy and to evaluate for any chronotropic incompetence I will order a stress echocardiogram.  This will allow us to see if she has any underlying structural heart disease.  And to make sure she has appropriate heart rate and blood pressure response to exercise.  If this is okay then I would consider a calcium score to see how aggressive we need to be about looking at other risk factors.

## 2023-07-28 NOTE — TELEPHONE ENCOUNTER
Waldo Han MD  You38 minutes ago (12:43 PM)     The calcium score can be done anytime. 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 She can wait if she wants because I wouldn t start her on any medicine while she s breast-feeding anyways     Orders placed for testing. Bleachers message routed to patient with Dr Han's reply.

## 2023-09-27 ENCOUNTER — TELEPHONE (OUTPATIENT)
Dept: CARDIOLOGY | Facility: CLINIC | Age: 33
End: 2023-09-27
Payer: COMMERCIAL

## 2023-09-27 NOTE — TELEPHONE ENCOUNTER
----- Message from Lashonda Haro sent at 9/27/2023 10:24 AM CDT -----  Regarding: Change expected date on order  Good morning,  I called this patient to set up a CT coronary calcium scan and some lipids. She stated Dr. 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 Would you please change the expected date on these to March 2024.    Thank you so much,    Lashonda

## 2024-08-25 ENCOUNTER — HEALTH MAINTENANCE LETTER (OUTPATIENT)
Age: 34
End: 2024-08-25